# Patient Record
Sex: FEMALE | Race: OTHER | HISPANIC OR LATINO | URBAN - METROPOLITAN AREA
[De-identification: names, ages, dates, MRNs, and addresses within clinical notes are randomized per-mention and may not be internally consistent; named-entity substitution may affect disease eponyms.]

---

## 2019-11-19 ENCOUNTER — EMERGENCY (EMERGENCY)
Facility: HOSPITAL | Age: 84
LOS: 1 days | Discharge: HOME | End: 2019-11-19
Attending: EMERGENCY MEDICINE
Payer: SELF-PAY

## 2019-11-19 ENCOUNTER — INPATIENT (INPATIENT)
Facility: HOSPITAL | Age: 84
LOS: 5 days | Discharge: HOME | End: 2019-11-25
Attending: INTERNAL MEDICINE | Admitting: INTERNAL MEDICINE
Payer: SELF-PAY

## 2019-11-19 VITALS
SYSTOLIC BLOOD PRESSURE: 169 MMHG | HEART RATE: 97 BPM | DIASTOLIC BLOOD PRESSURE: 84 MMHG | RESPIRATION RATE: 18 BRPM | TEMPERATURE: 98 F | OXYGEN SATURATION: 97 %

## 2019-11-19 VITALS
HEART RATE: 99 BPM | DIASTOLIC BLOOD PRESSURE: 72 MMHG | SYSTOLIC BLOOD PRESSURE: 136 MMHG | TEMPERATURE: 98 F | OXYGEN SATURATION: 97 % | RESPIRATION RATE: 16 BRPM

## 2019-11-19 VITALS
TEMPERATURE: 98 F | HEART RATE: 83 BPM | RESPIRATION RATE: 16 BRPM | OXYGEN SATURATION: 96 % | SYSTOLIC BLOOD PRESSURE: 156 MMHG | WEIGHT: 171.96 LBS | DIASTOLIC BLOOD PRESSURE: 86 MMHG | HEIGHT: 57 IN

## 2019-11-19 DIAGNOSIS — R04.0 EPISTAXIS: ICD-10-CM

## 2019-11-19 PROBLEM — Z00.00 ENCOUNTER FOR PREVENTIVE HEALTH EXAMINATION: Status: ACTIVE | Noted: 2019-11-19

## 2019-11-19 LAB
ALBUMIN SERPL ELPH-MCNC: 4.2 G/DL — SIGNIFICANT CHANGE UP (ref 3.3–5)
ALP SERPL-CCNC: 87 U/L — SIGNIFICANT CHANGE UP (ref 40–120)
ALT FLD-CCNC: 10 U/L — SIGNIFICANT CHANGE UP (ref 4–33)
ANION GAP SERPL CALC-SCNC: 15 MMO/L — HIGH (ref 7–14)
ANION GAP SERPL CALC-SCNC: 7 MMOL/L — SIGNIFICANT CHANGE UP (ref 5–17)
APTT BLD: 27.7 SEC — SIGNIFICANT CHANGE UP (ref 27.5–36.3)
AST SERPL-CCNC: 19 U/L — SIGNIFICANT CHANGE UP (ref 4–32)
BASOPHILS # BLD AUTO: 0.03 K/UL — SIGNIFICANT CHANGE UP (ref 0–0.2)
BASOPHILS # BLD AUTO: 0.05 K/UL — SIGNIFICANT CHANGE UP (ref 0–0.2)
BASOPHILS NFR BLD AUTO: 0.2 % — SIGNIFICANT CHANGE UP (ref 0–2)
BASOPHILS NFR BLD AUTO: 0.2 % — SIGNIFICANT CHANGE UP (ref 0–2)
BILIRUB SERPL-MCNC: 0.6 MG/DL — SIGNIFICANT CHANGE UP (ref 0.2–1.2)
BLD GP AB SCN SERPL QL: POSITIVE — SIGNIFICANT CHANGE UP
BUN SERPL-MCNC: 11 MG/DL — SIGNIFICANT CHANGE UP (ref 7–18)
BUN SERPL-MCNC: 12 MG/DL — SIGNIFICANT CHANGE UP (ref 7–23)
CALCIUM SERPL-MCNC: 9.1 MG/DL — SIGNIFICANT CHANGE UP (ref 8.4–10.5)
CALCIUM SERPL-MCNC: 9.2 MG/DL — SIGNIFICANT CHANGE UP (ref 8.4–10.5)
CHLORIDE SERPL-SCNC: 102 MMOL/L — SIGNIFICANT CHANGE UP (ref 96–108)
CHLORIDE SERPL-SCNC: 98 MMOL/L — SIGNIFICANT CHANGE UP (ref 98–107)
CO2 SERPL-SCNC: 24 MMOL/L — SIGNIFICANT CHANGE UP (ref 22–31)
CO2 SERPL-SCNC: 27 MMOL/L — SIGNIFICANT CHANGE UP (ref 22–31)
CREAT SERPL-MCNC: 0.67 MG/DL — SIGNIFICANT CHANGE UP (ref 0.5–1.3)
CREAT SERPL-MCNC: 0.7 MG/DL — SIGNIFICANT CHANGE UP (ref 0.5–1.3)
EOSINOPHIL # BLD AUTO: 0 K/UL — SIGNIFICANT CHANGE UP (ref 0–0.5)
EOSINOPHIL # BLD AUTO: 0.01 K/UL — SIGNIFICANT CHANGE UP (ref 0–0.5)
EOSINOPHIL NFR BLD AUTO: 0 % — SIGNIFICANT CHANGE UP (ref 0–6)
EOSINOPHIL NFR BLD AUTO: 0.1 % — SIGNIFICANT CHANGE UP (ref 0–6)
GLUCOSE SERPL-MCNC: 123 MG/DL — HIGH (ref 70–99)
GLUCOSE SERPL-MCNC: 132 MG/DL — HIGH (ref 70–99)
HCT VFR BLD CALC: 38.5 % — SIGNIFICANT CHANGE UP (ref 34.5–45)
HCT VFR BLD CALC: 39.2 % — SIGNIFICANT CHANGE UP (ref 34.5–45)
HGB BLD-MCNC: 12.3 G/DL — SIGNIFICANT CHANGE UP (ref 11.5–15.5)
HGB BLD-MCNC: 12.3 G/DL — SIGNIFICANT CHANGE UP (ref 11.5–15.5)
IMM GRANULOCYTES NFR BLD AUTO: 0.5 % — SIGNIFICANT CHANGE UP (ref 0–1.5)
IMM GRANULOCYTES NFR BLD AUTO: 0.5 % — SIGNIFICANT CHANGE UP (ref 0–1.5)
INR BLD: 1.1 RATIO — SIGNIFICANT CHANGE UP (ref 0.88–1.16)
LYMPHOCYTES # BLD AUTO: 15.9 % — SIGNIFICANT CHANGE UP (ref 13–44)
LYMPHOCYTES # BLD AUTO: 17.2 % — SIGNIFICANT CHANGE UP (ref 13–44)
LYMPHOCYTES # BLD AUTO: 3.07 K/UL — SIGNIFICANT CHANGE UP (ref 1–3.3)
LYMPHOCYTES # BLD AUTO: 3.19 K/UL — SIGNIFICANT CHANGE UP (ref 1–3.3)
MCHC RBC-ENTMCNC: 23.5 PG — LOW (ref 27–34)
MCHC RBC-ENTMCNC: 23.8 PG — LOW (ref 27–34)
MCHC RBC-ENTMCNC: 31.4 GM/DL — LOW (ref 32–36)
MCHC RBC-ENTMCNC: 31.9 % — LOW (ref 32–36)
MCV RBC AUTO: 74.6 FL — LOW (ref 80–100)
MCV RBC AUTO: 75 FL — LOW (ref 80–100)
MONOCYTES # BLD AUTO: 1.35 K/UL — HIGH (ref 0–0.9)
MONOCYTES # BLD AUTO: 1.45 K/UL — HIGH (ref 0–0.9)
MONOCYTES NFR BLD AUTO: 7.2 % — SIGNIFICANT CHANGE UP (ref 2–14)
MONOCYTES NFR BLD AUTO: 7.6 % — SIGNIFICANT CHANGE UP (ref 2–14)
NEUTROPHILS # BLD AUTO: 13.27 K/UL — HIGH (ref 1.8–7.4)
NEUTROPHILS # BLD AUTO: 15.27 K/UL — HIGH (ref 1.8–7.4)
NEUTROPHILS NFR BLD AUTO: 74.4 % — SIGNIFICANT CHANGE UP (ref 43–77)
NEUTROPHILS NFR BLD AUTO: 76.2 % — SIGNIFICANT CHANGE UP (ref 43–77)
NRBC # BLD: 0 /100 WBCS — SIGNIFICANT CHANGE UP (ref 0–0)
NRBC # FLD: 0 K/UL — SIGNIFICANT CHANGE UP (ref 0–0)
PLATELET # BLD AUTO: 269 K/UL — SIGNIFICANT CHANGE UP (ref 150–400)
PLATELET # BLD AUTO: 275 K/UL — SIGNIFICANT CHANGE UP (ref 150–400)
PMV BLD: 11.7 FL — SIGNIFICANT CHANGE UP (ref 7–13)
POTASSIUM SERPL-MCNC: 3.7 MMOL/L — SIGNIFICANT CHANGE UP (ref 3.5–5.3)
POTASSIUM SERPL-MCNC: 3.8 MMOL/L — SIGNIFICANT CHANGE UP (ref 3.5–5.3)
POTASSIUM SERPL-SCNC: 3.7 MMOL/L — SIGNIFICANT CHANGE UP (ref 3.5–5.3)
POTASSIUM SERPL-SCNC: 3.8 MMOL/L — SIGNIFICANT CHANGE UP (ref 3.5–5.3)
PROT SERPL-MCNC: 7.6 G/DL — SIGNIFICANT CHANGE UP (ref 6–8.3)
PROTHROM AB SERPL-ACNC: 12.2 SEC — SIGNIFICANT CHANGE UP (ref 10–12.9)
RBC # BLD: 5.16 M/UL — SIGNIFICANT CHANGE UP (ref 3.8–5.2)
RBC # BLD: 5.23 M/UL — HIGH (ref 3.8–5.2)
RBC # FLD: 17.5 % — HIGH (ref 10.3–14.5)
RBC # FLD: 17.6 % — HIGH (ref 10.3–14.5)
RH IG SCN BLD-IMP: POSITIVE — SIGNIFICANT CHANGE UP
SODIUM SERPL-SCNC: 136 MMOL/L — SIGNIFICANT CHANGE UP (ref 135–145)
SODIUM SERPL-SCNC: 137 MMOL/L — SIGNIFICANT CHANGE UP (ref 135–145)
WBC # BLD: 17.82 K/UL — HIGH (ref 3.8–10.5)
WBC # BLD: 20.06 K/UL — HIGH (ref 3.8–10.5)
WBC # FLD AUTO: 17.82 K/UL — HIGH (ref 3.8–10.5)
WBC # FLD AUTO: 20.06 K/UL — HIGH (ref 3.8–10.5)

## 2019-11-19 PROCEDURE — 85027 COMPLETE CBC AUTOMATED: CPT

## 2019-11-19 PROCEDURE — 85610 PROTHROMBIN TIME: CPT

## 2019-11-19 PROCEDURE — 99284 EMERGENCY DEPT VISIT MOD MDM: CPT

## 2019-11-19 PROCEDURE — 86905 BLOOD TYPING RBC ANTIGENS: CPT

## 2019-11-19 PROCEDURE — 86901 BLOOD TYPING SEROLOGIC RH(D): CPT

## 2019-11-19 PROCEDURE — 86870 RBC ANTIBODY IDENTIFICATION: CPT

## 2019-11-19 PROCEDURE — 36415 COLL VENOUS BLD VENIPUNCTURE: CPT

## 2019-11-19 PROCEDURE — 80048 BASIC METABOLIC PNL TOTAL CA: CPT

## 2019-11-19 PROCEDURE — 86850 RBC ANTIBODY SCREEN: CPT

## 2019-11-19 PROCEDURE — 96374 THER/PROPH/DIAG INJ IV PUSH: CPT

## 2019-11-19 PROCEDURE — 99053 MED SERV 10PM-8AM 24 HR FAC: CPT

## 2019-11-19 PROCEDURE — 99284 EMERGENCY DEPT VISIT MOD MDM: CPT | Mod: 25

## 2019-11-19 PROCEDURE — 86900 BLOOD TYPING SEROLOGIC ABO: CPT

## 2019-11-19 PROCEDURE — 85730 THROMBOPLASTIN TIME PARTIAL: CPT

## 2019-11-19 RX ORDER — OXYMETAZOLINE HYDROCHLORIDE 0.5 MG/ML
2 SPRAY NASAL ONCE
Refills: 0 | Status: COMPLETED | OUTPATIENT
Start: 2019-11-19 | End: 2019-11-19

## 2019-11-19 RX ORDER — SODIUM CHLORIDE 9 MG/ML
3 INJECTION INTRAMUSCULAR; INTRAVENOUS; SUBCUTANEOUS ONCE
Refills: 0 | Status: COMPLETED | OUTPATIENT
Start: 2019-11-19 | End: 2019-11-19

## 2019-11-19 RX ORDER — CEPHALEXIN 500 MG
500 CAPSULE ORAL ONCE
Refills: 0 | Status: COMPLETED | OUTPATIENT
Start: 2019-11-19 | End: 2019-11-19

## 2019-11-19 RX ORDER — TRANEXAMIC ACID 100 MG/ML
1000 INJECTION, SOLUTION INTRAVENOUS ONCE
Refills: 0 | Status: COMPLETED | OUTPATIENT
Start: 2019-11-19 | End: 2019-11-19

## 2019-11-19 RX ORDER — SODIUM CHLORIDE 9 MG/ML
1000 INJECTION INTRAMUSCULAR; INTRAVENOUS; SUBCUTANEOUS ONCE
Refills: 0 | Status: COMPLETED | OUTPATIENT
Start: 2019-11-19 | End: 2019-11-19

## 2019-11-19 RX ORDER — LABETALOL HCL 100 MG
10 TABLET ORAL ONCE
Refills: 0 | Status: COMPLETED | OUTPATIENT
Start: 2019-11-19 | End: 2019-11-19

## 2019-11-19 RX ADMIN — Medication 500 MILLIGRAM(S): at 19:53

## 2019-11-19 RX ADMIN — Medication 10 MILLIGRAM(S): at 17:05

## 2019-11-19 RX ADMIN — TRANEXAMIC ACID 220 MILLIGRAM(S): 100 INJECTION, SOLUTION INTRAVENOUS at 06:00

## 2019-11-19 RX ADMIN — SODIUM CHLORIDE 1000 MILLILITER(S): 9 INJECTION INTRAMUSCULAR; INTRAVENOUS; SUBCUTANEOUS at 14:39

## 2019-11-19 RX ADMIN — SODIUM CHLORIDE 3 MILLILITER(S): 9 INJECTION INTRAMUSCULAR; INTRAVENOUS; SUBCUTANEOUS at 07:05

## 2019-11-19 NOTE — ED PROVIDER NOTE - PROGRESS NOTE DETAILS
Maryuri Anthony MD: Pt s/p rhinorocket L nostril still with persistent bleeding. Hypertensive to 190/90's reduced to 160/60's. ENT repaged admit to CDU for obs overnight, ent to remove rhinorocket in am

## 2019-11-19 NOTE — ED ADULT NURSE NOTE - CHIEF COMPLAINT QUOTE
Pt c/o nose bleed , seen at [place] and [place]; packing done in [place].  Denies use of blood thinner, headache, dizziness

## 2019-11-19 NOTE — PROGRESS NOTE ADULT - SUBJECTIVE AND OBJECTIVE BOX
ORL following for epistaxis    Right rhinorocket placed in ED, left rhinorocket placed by ORL  [facility] called about recurrent left sided nasal bleeding    Left rhinorocket inflated to 7.5cc with resolution of active bleeding  No active bleeding from nare or oropharynx    83 no sig PMH presented with epistaxis, possible bilateral  -s/p bilateral rhino rocket  -admit to medicine for monitoring, needs monitored setting pulse ox, cardiac monitoring  -BP control  -leave bilateral rhino rockets in place  -keflex while nasal packing in place  -page/call if persistent, recurrent epistaxis  -will follow

## 2019-11-19 NOTE — ED PROVIDER NOTE - PATIENT PORTAL LINK FT
You can access the FollowMyHealth Patient Portal offered by [facility] by registering at the following website: [email or web address]. By joining [facility]’s FollowMyHealth portal, you will also be able to view your health information using other applications (apps) compatible with our system.

## 2019-11-19 NOTE — ED PROVIDER NOTE - OBJECTIVE STATEMENT
Pt with epistaxis from both nostrils x 3 days, pt was evaluated past 2 days at [facility] and had initial b/l rhinoroket placed. Pt had left nostril packing removed yesterday. Pt with progressive left epistaxis. No reposited trauma, no LOC, no chest pain.  Right nostril Rhinorocket left in place.  Left nostril with active epistaxis, Merocel infused with TXA placed.  Pt is currently on Auxilin rx;d from [facility].  Pt is not taking antiplatelet or anticoagulant agents.

## 2019-11-19 NOTE — ED PROVIDER NOTE - ATTENDING CONTRIBUTION TO CARE
I performed a face to face bedside interview with patient regarding history of present illness, review of symptoms and past medical history. I completed an independent physical exam.  I have discussed patient's plan of care.   I agree with note as stated above, having amended the EMR as needed to reflect my findings. I have discussed the assessment and plan of care.  This includes during the time I functioned as the attending physician for this patient.  Attending Contribution to Care: agree with plan of resident. 82yo female with no significant PMH who presents with several days of epistaxis s/p packing of L nostril and rhinorocket placement in R nostril from OSH today. Progressing soaking of L packing which brings pt into [facility] ED. No AC use. No digital trauma, lightheadedness, dizziness, headache, CP, SOB. No hx of epistaxis in the past.   opposite rhinorocket placed with resolution of bleeding. observed in ed for 2 hours and stable for d/c pending no worsening bleed

## 2019-11-19 NOTE — ED PROVIDER NOTE - NS ED ROS FT
General: denies fever, chills  HENT: denies nasal congestion, sore throat, rhinorrhea  Eyes: denies vision changes  CV: denies chest pain  Resp: denies difficulty breathing, cough  Abdominal: denies nausea, vomiting, diarrhea, abdominal pain, blood in stool, dark stool  : denies pain with urination  MSK: denies recent trauma  Neuro: denies headaches, numbness, tingling, dizziness, lightheadedness.  Skin: denies new rashes  Endocrine: denies recent weight loss

## 2019-11-19 NOTE — ED PROVIDER NOTE - NSFOLLOWUPINSTRUCTIONS_ED_ALL_ED_FT
Return if pain, bleeding returns, weakness any concerns. Follow up with ENT call [phone] or [phone].  Continue with Amoxicillin.   See your doctor as soon as possible (within 1-2 days).   If you need further assistance for appointments you can contact the [place] Care Coordinator at [phone] or the [facility] Patient Access Services helpline at [phone] to find names/contact #s for a practitioner to follow up with.  Bring your discharge papers / test results with you to any follow up appointments.   In addition our outpatient [facility] is located at [address] [place] [address], tel: [phone].

## 2019-11-19 NOTE — ED PROVIDER NOTE - PHYSICAL EXAMINATION
CONSTITUTIONAL: Nontoxic, well nourished, well developed, elderly female, resting comfortably in no acute distress  HEAD: Normocephalic; atraumatic  EYES: Normal inspection, EOMI  ENMT: Blood soaked packing in L nostril, rhinorocket in R nostril, normal oropharynx  NECK: Supple; non-tender; no cervical lymphadenopathy  CARD: RRR; no audible murmurs, rubs, or gallops  RESP: No respiratory distress, lungs ctab/l  ABD: Soft, non-distended; non-tender; no rebound or guarding  EXT: No LE pitting edema or calf tenderness; distal pulses intact with good capillary refill  SKIN: Warm, dry, intact  NEURO: aaox3, moving all extremities spontaneously

## 2019-11-19 NOTE — ED PROVIDER NOTE - CLINICAL SUMMARY MEDICAL DECISION MAKING FREE TEXT BOX
Pt with 3rd ED visit for epistaxis (2 x [place], 1X at ).. ENT paged  via Transfer center. Pt with current hemostasis. 7:15p Pt with 3rd ED visit for epistaxis (2 x [place], 1X at ).. ENT paged  via Transfer center, awaiting call back. Pt with current hemostasis. 7:15p Pt with 3rd ED visit for epistaxis (2 x [place], 1X at ).. ENT paged  via Transfer center, awaiting call back. Pt with current hemostasis.  7:40a- I spoke to ENT YULIANA Condon, informed given current hemostasis and no active bleeding after packing, packing will still be left in place and any further procedures will not be performed. Pt and pt's grand-daughter (Jamila) agrees with current dc and ENT office follow up cotnacts provided.   Pt is well appearing, has no new complaints and able to walk with normal gait. Pt is stable for discharge and follow up with medical doctor. Pt educated on care and need for follow up. Discussed anticipatory guidance and return precautions. Questions answered. I had a detailed discussion with the patient and/or guardian regarding the historical points, exam findings, and any diagnostic results supporting the discharge diagnosis.

## 2019-11-19 NOTE — ED ADULT TRIAGE NOTE - CHIEF COMPLAINT QUOTE
Pt c/o nose bleed , seen at [place] and [place]; packing done in [place].  Denies use of blood thinner, headache, dizziness

## 2019-11-19 NOTE — ED ADULT NURSE REASSESSMENT NOTE - NS ED NURSE REASSESS COMMENT FT1
pt received alert and oreinted x3. pt nad. has b/l rhinorockets in place. in bleeding noted at the moment. iv intact. Call bell in reach, warm blanket provided, bed in lowest position, side rails up x2,safety maintained. will continue to monitor.

## 2019-11-19 NOTE — CONSULT NOTE ADULT - SUBJECTIVE AND OBJECTIVE BOX
83 year old female with no medical illnesses that presents to the ED with BL epistaxis after being evaluated  at OSH. Patient had right RR. now w bleeding on left. Denies any blood thinners or trauma.   No other complaints.     AVSS  HEENT:  mouth: + post. pharyngeal bleeding.   Placed RR on left 5cm an inflated with 5cc of air.  Patient tolerated well. No additional bleeding from   posterior pharynx of left nares.

## 2019-11-19 NOTE — CONSULT NOTE ADULT - PROBLEM SELECTOR RECOMMENDATION 9
1. Observe with Rhino rockets in place for 2 hours. then can be discharged with follow up at clinic or ED for removal on friday.  2. Keflex 500mg bid for 3 days with packing   3. Return to ED with any additional.

## 2019-11-19 NOTE — PROGRESS NOTE ADULT - SUBJECTIVE AND OBJECTIVE BOX
ENT came by for reassessment. /91. Repeat /90. No active bleeding with bl RR in place. Plan to remove L RR.     Recommendations:  - BP management with goal <140/70  - please page ENT after BP med given and BP stabilized for removal of L RR  - if patient does require bilateral RR, will need to be admitted for monitoring    - home recommendations:  - keflex while packing in place  - afrin TID bilateral nasal cavities x3 days, then only for bleeding  - nasal saline sprays to bilateral nasal cavities qid (while packing in place) to keep packing moist  - humidification

## 2019-11-19 NOTE — ED ADULT NURSE NOTE - NSIMPLEMENTINTERV_GEN_ALL_ED
Implemented All Fall Risk Interventions:  [place] to call system. Call bell, personal items and telephone within reach. Instruct patient to call for assistance. Room bathroom lighting operational. Non-slip footwear when patient is off stretcher. Physically safe environment: no spills, clutter or unnecessary equipment. Stretcher in lowest position, wheels locked, appropriate side rails in place. Provide visual cue, wrist band, yellow gown, etc. Monitor gait and stability. Monitor for mental status changes and reorient to person, place, and time. Review medications for side effects contributing to fall risk. Reinforce activity limits and safety measures with patient and family.

## 2019-11-19 NOTE — ED PROVIDER NOTE - OBJECTIVE STATEMENT
Maryuri Anthony MD: 82yo female with no significant PMH who presents with several days of epistaxis s/p packing of L nostril and rhinorocket placement in R nostril from OSH today. Progressing soaking of L packing which brings pt into [facility] ED. No AC use. No digital trauma, lightheadedness, dizziness, headache, CP, SOB. No hx of epistaxis in the past.

## 2019-11-19 NOTE — ED ADULT TRIAGE NOTE - CHIEF COMPLAINT QUOTE
c/o on and off bleeding from the nose since yesterday,was seen in [place] yesterday also as per son

## 2019-11-19 NOTE — ED ADULT NURSE NOTE - CHIEF COMPLAINT QUOTE
c/o on and off bleeding from the nose since yesterday, was seen in [place] yesterday also as per son

## 2019-11-19 NOTE — ED ADULT NURSE NOTE - OBJECTIVE STATEMENT
[place] RN note: Pt. A&Ox3, primarily Israeli speaking with family at bedside for translation. Pt. c/o intermittent nosebleed to b/l nostrils since Sunday. States he went to [place] Sunday and yesterday. Then went to [place] this AM and was told if bleeding persisted to come to [facility]. Pt. c/o facial pain, dizziness and weakness. Family states packing was changed 4-5 times. Packing appears soaked with blood. #20g IV Placed in let AC, labs sent. MD at bedside for eval. VS done as charted, breathing well on RA. Respirations even and unlabored. Will continue to monitor.

## 2019-11-19 NOTE — ED PROVIDER NOTE - CLINICAL SUMMARY MEDICAL DECISION MAKING FREE TEXT BOX
Maryuri Anthony MD: 84yo female with no significant PMH who presents with several days of epistaxis s/p packing of L nostril and rhinorocket placement in R nostril from OSH today. Pt hemodynamically stable, afebrile. Well appearing. B/l nostrils packed with progressive bleeding. Plan: labs, ENT consult

## 2019-11-20 DIAGNOSIS — Z90.49 ACQUIRED ABSENCE OF OTHER SPECIFIED PARTS OF DIGESTIVE TRACT: Chronic | ICD-10-CM

## 2019-11-20 DIAGNOSIS — R65.10 SYSTEMIC INFLAMMATORY RESPONSE SYNDROME (SIRS) OF NON-INFECTIOUS ORIGIN WITHOUT ACUTE ORGAN DYSFUNCTION: ICD-10-CM

## 2019-11-20 DIAGNOSIS — R04.0 EPISTAXIS: ICD-10-CM

## 2019-11-20 DIAGNOSIS — I10 ESSENTIAL (PRIMARY) HYPERTENSION: ICD-10-CM

## 2019-11-20 DIAGNOSIS — A41.9 SEPSIS, UNSPECIFIED ORGANISM: ICD-10-CM

## 2019-11-20 DIAGNOSIS — D62 ACUTE POSTHEMORRHAGIC ANEMIA: ICD-10-CM

## 2019-11-20 DIAGNOSIS — Z29.9 ENCOUNTER FOR PROPHYLACTIC MEASURES, UNSPECIFIED: ICD-10-CM

## 2019-11-20 LAB
ANISOCYTOSIS BLD QL: SLIGHT — SIGNIFICANT CHANGE UP
APPEARANCE UR: CLEAR — SIGNIFICANT CHANGE UP
BASE EXCESS BLDV CALC-SCNC: 1.5 MMOL/L — SIGNIFICANT CHANGE UP
BASOPHILS # BLD AUTO: 0.07 K/UL — SIGNIFICANT CHANGE UP (ref 0–0.2)
BASOPHILS NFR BLD AUTO: 0.4 % — SIGNIFICANT CHANGE UP (ref 0–2)
BASOPHILS NFR SPEC: 0 % — SIGNIFICANT CHANGE UP (ref 0–2)
BILIRUB UR-MCNC: NEGATIVE — SIGNIFICANT CHANGE UP
BLASTS # FLD: 0 % — SIGNIFICANT CHANGE UP (ref 0–0)
BLOOD GAS VENOUS - CREATININE: 0.59 MG/DL — SIGNIFICANT CHANGE UP (ref 0.5–1.3)
BLOOD GAS VENOUS - FIO2: 21 — SIGNIFICANT CHANGE UP
BLOOD UR QL VISUAL: NEGATIVE — SIGNIFICANT CHANGE UP
CHLORIDE BLDV-SCNC: 102 MMOL/L — SIGNIFICANT CHANGE UP (ref 96–108)
COLOR SPEC: SIGNIFICANT CHANGE UP
EOSINOPHIL # BLD AUTO: 0.03 K/UL — SIGNIFICANT CHANGE UP (ref 0–0.5)
EOSINOPHIL NFR BLD AUTO: 0.2 % — SIGNIFICANT CHANGE UP (ref 0–6)
EOSINOPHIL NFR FLD: 0 % — SIGNIFICANT CHANGE UP (ref 0–6)
GAS PNL BLDV: 134 MMOL/L — LOW (ref 136–146)
GIANT PLATELETS BLD QL SMEAR: PRESENT — SIGNIFICANT CHANGE UP
GLUCOSE BLDV-MCNC: 140 MG/DL — HIGH (ref 70–99)
GLUCOSE UR-MCNC: NEGATIVE — SIGNIFICANT CHANGE UP
HBA1C BLD-MCNC: 5.7 % — HIGH (ref 4–5.6)
HCO3 BLDV-SCNC: 25 MMOL/L — SIGNIFICANT CHANGE UP (ref 20–27)
HCT VFR BLD CALC: 33.1 % — LOW (ref 34.5–45)
HCT VFR BLDV CALC: 32.7 % — LOW (ref 34.5–45)
HGB BLD-MCNC: 10.8 G/DL — LOW (ref 11.5–15.5)
HGB BLDV-MCNC: 10.6 G/DL — LOW (ref 11.5–15.5)
HYPOCHROMIA BLD QL: SIGNIFICANT CHANGE UP
IMM GRANULOCYTES NFR BLD AUTO: 0.5 % — SIGNIFICANT CHANGE UP (ref 0–1.5)
KETONES UR-MCNC: NEGATIVE — SIGNIFICANT CHANGE UP
LACTATE BLDV-MCNC: 2.1 MMOL/L — HIGH (ref 0.5–2)
LEUKOCYTE ESTERASE UR-ACNC: NEGATIVE — SIGNIFICANT CHANGE UP
LYMPHOCYTES # BLD AUTO: 19 % — SIGNIFICANT CHANGE UP (ref 13–44)
LYMPHOCYTES # BLD AUTO: 3.52 K/UL — HIGH (ref 1–3.3)
LYMPHOCYTES NFR SPEC AUTO: 4.6 % — LOW (ref 13–44)
MCHC RBC-ENTMCNC: 24.4 PG — LOW (ref 27–34)
MCHC RBC-ENTMCNC: 32.6 % — SIGNIFICANT CHANGE UP (ref 32–36)
MCV RBC AUTO: 74.7 FL — LOW (ref 80–100)
METAMYELOCYTES # FLD: 0.9 % — SIGNIFICANT CHANGE UP (ref 0–1)
MICROCYTES BLD QL: SIGNIFICANT CHANGE UP
MONOCYTES # BLD AUTO: 1.57 K/UL — HIGH (ref 0–0.9)
MONOCYTES NFR BLD AUTO: 8.5 % — SIGNIFICANT CHANGE UP (ref 2–14)
MONOCYTES NFR BLD: 7.3 % — SIGNIFICANT CHANGE UP (ref 2–9)
MYELOCYTES NFR BLD: 2.8 % — HIGH (ref 0–0)
NEUTROPHIL AB SER-ACNC: 79.8 % — HIGH (ref 43–77)
NEUTROPHILS # BLD AUTO: 13.2 K/UL — HIGH (ref 1.8–7.4)
NEUTROPHILS NFR BLD AUTO: 71.4 % — SIGNIFICANT CHANGE UP (ref 43–77)
NEUTS BAND # BLD: 0 % — SIGNIFICANT CHANGE UP (ref 0–6)
NITRITE UR-MCNC: NEGATIVE — SIGNIFICANT CHANGE UP
NRBC # BLD: 2 /100WBC — SIGNIFICANT CHANGE UP
NRBC # FLD: 0 K/UL — SIGNIFICANT CHANGE UP (ref 0–0)
OTHER - HEMATOLOGY %: 0 — SIGNIFICANT CHANGE UP
OVALOCYTES BLD QL SMEAR: SLIGHT — SIGNIFICANT CHANGE UP
PCO2 BLDV: 41 MMHG — SIGNIFICANT CHANGE UP (ref 41–51)
PH BLDV: 7.41 PH — SIGNIFICANT CHANGE UP (ref 7.32–7.43)
PH UR: 7 — SIGNIFICANT CHANGE UP (ref 5–8)
PLATELET # BLD AUTO: 222 K/UL — SIGNIFICANT CHANGE UP (ref 150–400)
PLATELET COUNT - ESTIMATE: NORMAL — SIGNIFICANT CHANGE UP
PMV BLD: 11.6 FL — SIGNIFICANT CHANGE UP (ref 7–13)
PO2 BLDV: 45 MMHG — HIGH (ref 35–40)
POIKILOCYTOSIS BLD QL AUTO: SLIGHT — SIGNIFICANT CHANGE UP
POTASSIUM BLDV-SCNC: 3.6 MMOL/L — SIGNIFICANT CHANGE UP (ref 3.4–4.5)
PROMYELOCYTES # FLD: 0 % — SIGNIFICANT CHANGE UP (ref 0–0)
PROT UR-MCNC: 10 — SIGNIFICANT CHANGE UP
RBC # BLD: 4.43 M/UL — SIGNIFICANT CHANGE UP (ref 3.8–5.2)
RBC # FLD: 17.2 % — HIGH (ref 10.3–14.5)
SAO2 % BLDV: 81.9 % — SIGNIFICANT CHANGE UP (ref 60–85)
SP GR SPEC: 1.02 — SIGNIFICANT CHANGE UP (ref 1–1.04)
STOMATOCYTES BLD QL SMEAR: SLIGHT — SIGNIFICANT CHANGE UP
UROBILINOGEN FLD QL: NORMAL — SIGNIFICANT CHANGE UP
VARIANT LYMPHS # BLD: 4.6 % — SIGNIFICANT CHANGE UP
WBC # BLD: 18.48 K/UL — HIGH (ref 3.8–10.5)
WBC # FLD AUTO: 18.48 K/UL — HIGH (ref 3.8–10.5)

## 2019-11-20 PROCEDURE — 71046 X-RAY EXAM CHEST 2 VIEWS: CPT | Mod: 26

## 2019-11-20 PROCEDURE — 99223 1ST HOSP IP/OBS HIGH 75: CPT | Mod: GC

## 2019-11-20 RX ORDER — AMLODIPINE BESYLATE 2.5 MG/1
5 TABLET ORAL DAILY
Refills: 0 | Status: DISCONTINUED | OUTPATIENT
Start: 2019-11-20 | End: 2019-11-25

## 2019-11-20 RX ORDER — CEPHALEXIN 500 MG
500 CAPSULE ORAL EVERY 12 HOURS
Refills: 0 | Status: DISCONTINUED | OUTPATIENT
Start: 2019-11-20 | End: 2019-11-22

## 2019-11-20 RX ORDER — ACETAMINOPHEN 500 MG
650 TABLET ORAL ONCE
Refills: 0 | Status: COMPLETED | OUTPATIENT
Start: 2019-11-20 | End: 2019-11-20

## 2019-11-20 RX ORDER — SODIUM CHLORIDE 9 MG/ML
1000 INJECTION INTRAMUSCULAR; INTRAVENOUS; SUBCUTANEOUS ONCE
Refills: 0 | Status: COMPLETED | OUTPATIENT
Start: 2019-11-20 | End: 2019-11-20

## 2019-11-20 RX ORDER — CEPHALEXIN 500 MG
500 CAPSULE ORAL EVERY 12 HOURS
Refills: 0 | Status: DISCONTINUED | OUTPATIENT
Start: 2019-11-19 | End: 2019-11-20

## 2019-11-20 RX ADMIN — AMLODIPINE BESYLATE 5 MILLIGRAM(S): 2.5 TABLET ORAL at 18:13

## 2019-11-20 RX ADMIN — Medication 650 MILLIGRAM(S): at 02:42

## 2019-11-20 RX ADMIN — SODIUM CHLORIDE 1000 MILLILITER(S): 9 INJECTION INTRAMUSCULAR; INTRAVENOUS; SUBCUTANEOUS at 02:43

## 2019-11-20 RX ADMIN — Medication 500 MILLIGRAM(S): at 18:13

## 2019-11-20 RX ADMIN — Medication 1 TABLET(S): at 06:46

## 2019-11-20 RX ADMIN — Medication 650 MILLIGRAM(S): at 03:15

## 2019-11-20 NOTE — H&P ADULT - HISTORY OF PRESENT ILLNESS
**THIS NOTE IS INCOMPLETE**    Patient is an 83 y.o F w/ no significant PMH who presented to the ED w/ epistasis that started a few days ago. She initially presented to [place] ED who placed bilateral rhinorockets and removed the left packing yesterday. She continued to have nasal bleeding so presented to [facility] ED.     In the ED, vitals T max 1005. HR 79=86 /80 -164/85 RR 16-18 SPO2 96-98% on RA. Notable labs: Hgb 12.3 --> 10.8, WBC 12.3, lactate 2.1. UA negative, CXR clear. s/p NS 1 L bolus x2, cephalexin x1, labetalol 10 IV x1, tylenol x1. Patient is an 83 y.o Bulgarian speaking F w/ no significant PMH (however has not been to a PCP in years) who presented to the ED w/ epistasis that started Sunday evening. She states she was in her usual state of health and watching television when she started having bleeding from her right nostril. She denies initially presented to [place] ED who placed bilateral rhinorockets and removed the left packing yesterday. She continued to have nasal bleeding so presented to [facility] ED.     In the ED, vitals T max 1005. HR 79=86 /80 -164/85 RR 16-18 SPO2 96-98% on RA. Notable labs: Hgb 12.3 --> 10.8, WBC 12.3, lactate 2.1. UA negative, CXR clear. s/p NS 1 L bolus x2, cephalexin x1, labetalol 10 IV x1, tylenol x1. Patient is an 83 y.o Telugu speaking F w/ no significant PMH (however has not been to a PCP in years) who presented to the ED w/ epistasis that started Sunday evening. Patient deferred translation to grand children who were at bedside. She states she was in her usual state of health and watching television when she started having bleeding from her right nostril. She denies any recent trauma, picking her nose, fever/chills, recent URI, or sick contacts. Her family initially brought her to [place] ED who placed a right rhinorocket. She was sent home but started to have left sided nasal bleeding and so she subsequently presented [place] ED where a left rhinorocket was placed. After being discharged from [place], she started having oozing around her packing which prompted her family to bring her to [facility] ED. She denies ever having bleeding episodes in the past, hematuria, blood in stool, ecchymoses, or gingival bleeding. She currently denies CP, SOB, or dizziness. She further denies taking any herbal medications, teas, aspirin, NSAIDs, or anticoagulation.     In the ED, vitals T max 1005. HR 79-86 /80 -164/85 RR 16-18 SPO2 96-98% on RA. Notable labs: Hgb 12.3 --> 10.8, WBC 12.3, lactate 2.1. UA negative, CXR clear. s/p NS 1 L bolus x2, cephalexin x1, labetalol 10 IV x1, tylenol x1.

## 2019-11-20 NOTE — H&P ADULT - PROBLEM SELECTOR PLAN 4
-Denies diagnosis of HTN but has not been to PCP in years   -BP elevated during hospitalization from 150s - 160s/80s. Will benefit from antihypertensive  -Start amlodipine 5 daily   -Monitor VS q8h

## 2019-11-20 NOTE — H&P ADULT - NSHPREVIEWOFSYSTEMS_GEN_ALL_CORE
CONSTITUTIONAL:  No weight loss, fever, chills, weakness or fatigue.  HEENT:  Eyes:  No visual loss, blurred vision, double vision or yellow sclerae. Ears, Nose, Throat:  No hearing loss, sneezing, congestion, runny nose or sore throat.  SKIN:  No rash or itching.  CARDIOVASCULAR:  No chest pain, chest pressure or chest discomfort. No palpitations or edema.  RESPIRATORY:  No shortness of breath, cough or sputum.  GASTROINTESTINAL:  No anorexia, nausea, vomiting or diarrhea. No abdominal pain or blood.  GENITOURINARY:  Denies hematuria, dysuria.   NEUROLOGICAL:  No headache, dizziness, syncope, paralysis, ataxia, numbness or tingling in the extremities. No change in bowel or bladder control.  MUSCULOSKELETAL:  No muscle, back pain, joint pain or stiffness.  HEMATOLOGIC:  No anemia, bleeding or bruising.  LYMPHATICS:  No enlarged nodes. No history of splenectomy.  PSYCHIATRIC:  No history of depression or anxiety.  ENDOCRINOLOGIC:  No reports of sweating, cold or heat intolerance. No polyuria or polydipsia.  ALLERGIES:  No history of asthma, hives, eczema or rhinitis. CONSTITUTIONAL:  No weight loss, fever, chills, weakness or fatigue.  HEENT:  Eyes:  No visual loss, blurred vision, double vision or yellow sclerae. Ears, Nose, Throat:  No hearing loss, sneezing, congestion, runny nose or sore throat.  SKIN:  No rash or itching.  CARDIOVASCULAR:  No chest pain, chest pressure or chest discomfort. No palpitations or edema.  RESPIRATORY:  No shortness of breath, cough or sputum.  GASTROINTESTINAL:  No anorexia, nausea, vomiting or diarrhea. No abdominal pain or blood.  GENITOURINARY:  Denies hematuria, dysuria.   NEUROLOGICAL:  No headache, dizziness, syncope, paralysis, ataxia, numbness or tingling in the extremities. No change in bowel or bladder control.  MUSCULOSKELETAL:  No muscle, back pain, joint pain or stiffness.  HEMATOLOGIC: +epistaxis, No anemia, bruising.  LYMPHATICS:  No enlarged nodes. No history of splenectomy.  PSYCHIATRIC:  No history of depression or anxiety.  ENDOCRINOLOGIC:  No reports of sweating, cold or heat intolerance. No polyuria or polydipsia.  ALLERGIES:  No history of asthma, hives, eczema or rhinitis.

## 2019-11-20 NOTE — H&P ADULT - PROBLEM SELECTOR PLAN 3
-P/w acute blood loss anemia 2/2 epistaxis  -Hgb initially 12.3, most recent CBC showed hgb 10.8  -Remains asymptomatic  -Achieved hemostasis w/ bilateral rhinorockets  -monitor CBC daily

## 2019-11-20 NOTE — H&P ADULT - NSHPPHYSICALEXAM_GEN_ALL_CORE
PHYSICAL EXAM:  GENERAL: NAD, well-developed  HEAD:  Atraumatic, Normocephalic  EYES: EOMI, PERRLA, conjunctiva and sclera clear  NECK: Supple, No JVD  CHEST/LUNG: Clear to auscultation bilaterally; No wheeze  HEART: Regular rate and rhythm; No murmurs, rubs, or gallops  ABDOMEN: Soft, Nontender, Nondistended; Bowel sounds present  EXTREMITIES:  2+ Peripheral Pulses, No clubbing, cyanosis, or edema  PSYCH: AAOx3  NEUROLOGY: non-focal  SKIN: No rashes or lesions PHYSICAL EXAM:  GENERAL: NAD  HEAD:  bilateral rhino rockets in place, no visible oozing around packing. Head atraumatic. No tenderness to palpation of maxillary sinuses  EYES: EOMI, PERRL, conjunctiva and sclera clear  NECK: Supple, no thyromegaly   CHEST/LUNG: Clear to auscultation bilaterally; No wheeze  HEART:S1 and S2, regular rate and rhythm; No murmurs, rubs, or gallops  ABDOMEN: Surgical scar noted above umbilcus, soft, Nontender, Nondistended; Bowel sounds present  EXTREMITIES:  2+ Peripheral Pulses, No clubbing, cyanosis, or edema  PSYCH: AAOx3  NEUROLOGY: non-focal  SKIN: No ecchymoses, rashes, or lesions

## 2019-11-20 NOTE — ED CDU PROVIDER SUBSEQUENT DAY NOTE - ATTENDING CONTRIBUTION TO CARE
agree with PA note    "83 y.o female with epistaxis sent to CDU for monitoring and ENT- ENT came by this morning unable to remove and keep packing out as it began to bleed again. Will trial gain tomorrow. Given fever, wbc and bilateral nasal packing will admit to medicine. Pt denies chest pain, sob, or trouble swallowing.  "    PE: b/l rhinorockets; no resp distress; CTAB/L; s1 s2 no m/r/g abd soft/NT/ND    given prolonged bleeding in elderly female with b/l rhinorockets will need to be admitted

## 2019-11-20 NOTE — ED CDU PROVIDER INITIAL DAY NOTE - ENMT, MLM
Airway patent. Mouth with normal mucosa. Throat has no vesicles, no oropharyngeal exudates and uvula is midline. B/L rhinorockets intact, no active epistaxis.

## 2019-11-20 NOTE — H&P ADULT - PROBLEM SELECTOR PLAN 2
P/w persistent epistaxis, isolated event   -Coags WNL, plt 275, no anti platelets or anticoagulation, no hx of trauma   -s/p b/l rhinorockets in place  -ENT consulted; will re-assess tomorrow whether to remove rhinorockets  -While b/l rhinorockets are in place, will need continuous pulse ox, monitor on telemetry   - c/w augmentin PO BID while nasal packing is in place  -On exam, no further over bleeding noted P/w persistent epistaxis, isolated event   -Coags WNL, plt 275, no anti platelets or anticoagulation, no hx of trauma   -s/p b/l rhinorockets in place  -ENT consulted; will re-assess tomorrow whether to remove rhinorockets  -While b/l rhinorockets are in place, will need continuous pulse ox, monitor on telemetry   - c/w keflex while nasal packing is in place  -On exam, no further over bleeding noted

## 2019-11-20 NOTE — ED CDU PROVIDER SUBSEQUENT DAY NOTE - PROGRESS NOTE DETAILS
Pt stable, no events overnight. Pt to be signed out to day team pending ENT reassess Pt spiked fever overnight, not tachycardic. WBC 17. will check cxr and UA to r/o infection, send blood cx, give tylenol, hydrate. pt denies any other complaints, no congestion, cough, abd pain, n/v/d, dysuria. pt on augmentin for rhinorockets. ent paged Spoke with ENT, states pt does not need to be admitted, can have on tele in cdu for continued monitoring and pulse ox. aware of fever and believes unlikely ENT in origin. states will likely take out one rhinorocket this morning and can test RVP.

## 2019-11-20 NOTE — H&P ADULT - NSHPLABSRESULTS_GEN_ALL_CORE
The Labs were reviewed by me   The Radiology was reviewed by me    EKG tracing reviewed by me        137  |  98  |  12  ----------------------------<  123<H>  3.7   |  24  |  0.67      136  |  102  |  11  ----------------------------<  132<H>  3.8   |  27  |  0.70    Ca    9.1      2019 13:50  Ca    9.2      2019 06:45    TPro  7.6  /  Alb  4.2  /  TBili  0.6  /  DBili  x   /  AST  19  /  ALT  10  /  AlkPhos  87            PT/INR - ( 2019 06:48 )   PT: 12.2 sec;   INR: 1.10 ratio         PTT - ( 2019 06:48 )  PTT:27.7 sec              Urinalysis Basic - ( 2019 02:30 )    Color: LIGHT YELLOW / Appearance: CLEAR / S.018 / pH: 7.0  Gluc: NEGATIVE / Ketone: NEGATIVE  / Bili: NEGATIVE / Urobili: NORMAL   Blood: NEGATIVE / Protein: 10 / Nitrite: NEGATIVE   Leuk Esterase: NEGATIVE / RBC: x / WBC x   Sq Epi: x / Non Sq Epi: x / Bacteria: x                              10.8   18.48 )-----------( 222      ( 2019 06:45 )             33.1                         12.3   17.82 )-----------( 269      ( 2019 13:50 )             38.5                         12.3   20.06 )-----------( 275      ( 2019 06:45 )             39.2     CAPILLARY BLOOD GLUCOSE

## 2019-11-20 NOTE — ED CDU PROVIDER DISPOSITION NOTE - CLINICAL COURSE
"83 y.o female with epistaxis sent to CDU for monitoring and ENT- ENT came by this morning unable to remove and keep packing out as it began to bleed again. Will trial gain tomorrow. Given fever, wbc and bilateral nasal packing will admit to medicine. Pt denies chest pain, sob, or trouble swallowing.  "PE: b/l rhinorockets; no resp distress; CTAB/L; s1 s2 no m/r/g abd soft/NT/ND  given prolonged bleeding in elderly female with b/l rhinorockets will need to be admitted

## 2019-11-20 NOTE — ED CDU PROVIDER SUBSEQUENT DAY NOTE - HISTORY
83 y.o female with epistaxis sent to CDU for monitoring and ENT- ENT came by this morning unable to remove and keep packing out as it began to bleed again. Will trial gain tomorrow. Given fever, wbc and bilateral nasal packing will admit to medicine. Pt denies chest pain, sob, or trouble swallowing.

## 2019-11-20 NOTE — H&P ADULT - PROBLEM SELECTOR PLAN 1
Meeting sepsis criteria Tmax 100.5, WBC 20.06, lactate 2.1, unclear etiology   -no localizing symptoms, CXR clear, UA negative  -f/u BCX  -Low suspicion for rhinorocket for source of infection as has not been in place for a  long period of time; c/w augmentin PO BID for prophylaxis   -monitor fever curve   -CBC daily to ensure leukocytosis downtrending Meeting sepsis criteria Tmax 100.5, WBC 20.06, lactate 2.1, unclear etiology, may in setting of stress response to epistaxis  -no localizing symptoms to point to source of infection, CXR clear, UA negative  -f/u BCX  -Low suspicion for rhinorocket for source of infection as has not been in place for a  long period of time; c/w keflex for prophylaxis   -monitor fever curve   -CBC daily to ensure leukocytosis downtrending

## 2019-11-20 NOTE — H&P ADULT - NSICDXPASTSURGICALHX_GEN_ALL_CORE_FT
PAST SURGICAL HISTORY:  No significant past surgical history PAST SURGICAL HISTORY:  S/P cholecystectomy >10 years ago

## 2019-11-20 NOTE — ED CDU PROVIDER INITIAL DAY NOTE - OBJECTIVE STATEMENT
Romanian speaking, suggesting grandson at bedside to translate:  84 y/o female with no pmhx presents to ED c/o b/l epistaxis x 3 days. Pt seen at [facility] and had left nostril packed and right rhinorocket placed. Pt seen in [facility] ED and had b/l rhinorockets placed. Pt bleeding well controlled. No trauma, no ASA. No fever, chills, congestion, sore throat, cp, sob, n/v, abd pain.

## 2019-11-20 NOTE — PROGRESS NOTE ADULT - SUBJECTIVE AND OBJECTIVE BOX
No acute events overnight  No further epistaxis    Vital Signs Last 24 Hrs  T(C): 37.1 (20 Nov 2019 05:27), Max: 38.1 (20 Nov 2019 01:30)  T(F): 98.7 (20 Nov 2019 05:27), Max: 100.5 (20 Nov 2019 01:30)  HR: 79 (20 Nov 2019 05:27) (79 - 99)  BP: 164/85 (20 Nov 2019 05:27) (136/72 - 190/91)  BP(mean): --  RR: 16 (20 Nov 2019 07:36) (16 - 18)  SpO2: 97% (20 Nov 2019 07:36) (95% - 98%)    Chinese speaking, NAD, resting  bilateral rhino rocket in place in each nasal cavity, no oozing around packing  OC/OP clear, no bleeding     A/P  83 F presented to ED with epistaxis    -s/p control of epistaxis with bilateral rhino rocket nasal packing  -BP control  -continue to monitor in monitored setting  -will keep rhino rocket packing in place  -will reassess rhino rockets for removal tomorrow  -keflex while nasal packing in place  -page/call if persistent, recurrent epistaxis  -will follow

## 2019-11-20 NOTE — ED CDU PROVIDER INITIAL DAY NOTE - MEDICAL DECISION MAKING DETAILS
82 y/o female with no pmhx presents to ED c/o b/l epistaxis x 3 days. Pt seen at [facility] and had left nostril packed and right rhinorocket placed. Pt seen in [facility] ED and had b/l rhinorockets placed. Pt bleeding well controlled. sent to CDU for observation and ENT reassess in AM, repeat CBC.

## 2019-11-20 NOTE — H&P ADULT - NSHPSOCIALHISTORY_GEN_ALL_CORE
Smoking: never smoker  Alcohol: denies  Recreational drug: denies  Lives with son, ambulates independently

## 2019-11-20 NOTE — H&P ADULT - ASSESSMENT
Patient is an 83 y.o F w/ no significant PMH who presented w/ acute blood loss anemia in the setting of persistent epistaxis, now found to meet sepsis criteria of unclear etiology

## 2019-11-20 NOTE — ED CDU PROVIDER SUBSEQUENT DAY NOTE - MEDICAL DECISION MAKING DETAILS
83 y.o female with epistaxis sent to CDU for monitoring and ENT- ENT came by this morning unable to remove and keep packing out as it began to bleed again. Will trial again tomorrow. Given fever, wbc and bilateral nasal packing will admit to medicine. No active bleeding.

## 2019-11-20 NOTE — H&P ADULT - ATTENDING COMMENTS
Patient seen and examined. Agree with above note by resident.    #Acute blood loss anemia - likely from epistaxis. Seen by ent s/p b/l rhinorockets. Needs continuous pulse ox monitoring and tele pa. Keflex as per ENT recommendations.     #HTN - no known history of HTN however patient does not see PCP regularly. Could be driven my acute events. needs better control given risk for bleeding. Start norvasc for now. Monitor BP. Titrate up as needed    # SIRS - fever and leukocytosis. Possible translocation? check blood cx. C/w keflex for now    Plan discussed with granddaughter at bedside Patient seen and examined. Agree with above note by resident.    #Acute blood loss anemia - likely from epistaxis. Seen by ent s/p b/l rhinorockets. Needs continuous pulse ox monitoring and tele monitoring. Keflex as per ENT recommendations.     #HTN - no known history of HTN however patient does not see PCP regularly. Could be driven my acute events. needs better control given risk for bleeding. Start norvasc for now. Monitor BP. Titrate up as needed    # SIRS - fever and leukocytosis. Possible translocation? check blood cx. C/w keflex for now    Plan discussed with granddaughter at bedside

## 2019-11-21 LAB
BACTERIA UR CULT: SIGNIFICANT CHANGE UP
SPECIMEN SOURCE: SIGNIFICANT CHANGE UP

## 2019-11-21 PROCEDURE — 99222 1ST HOSP IP/OBS MODERATE 55: CPT

## 2019-11-21 RX ADMIN — AMLODIPINE BESYLATE 5 MILLIGRAM(S): 2.5 TABLET ORAL at 05:27

## 2019-11-21 RX ADMIN — Medication 500 MILLIGRAM(S): at 05:28

## 2019-11-21 RX ADMIN — Medication 500 MILLIGRAM(S): at 17:49

## 2019-11-21 NOTE — PROGRESS NOTE ADULT - ASSESSMENT
recurrent epistaxis  now w/ packing in place as per ent  ent f/u  inc wbc likely from bleed  c/w keflex  ID eval  discussed plan w/ family at bedside

## 2019-11-21 NOTE — PROGRESS NOTE ADULT - SUBJECTIVE AND OBJECTIVE BOX
CHIEF COMPLAINT:Patient is a 83y old  Female who presents with a chief complaint of epistaxis (21 Nov 2019 15:25)    	        PAST MEDICAL & SURGICAL HISTORY:  No pertinent past medical history  S/P cholecystectomy: &gt;10 years ago          REVIEW OF SYSTEMS:  CONSTITUTIONAL: No fever, weight loss, or fatigue  EYES: No eye pain, visual disturbances, or discharge  NECK: No pain or stiffness  RESPIRATORY: No cough, wheezing, chills or hemoptysis; No Shortness of Breath  CARDIOVASCULAR: No chest pain, palpitations, passing out, dizziness, or leg swelling  GASTROINTESTINAL: No abdominal or epigastric pain      Medications:  MEDICATIONS  (STANDING):  amLODIPine   Tablet 5 milliGRAM(s) Oral daily  cephalexin 500 milliGRAM(s) Oral every 12 hours    MEDICATIONS  (PRN):    	    PHYSICAL EXAM:  T(C): 37 (11-21-19 @ 11:24), Max: 37.4 (11-21-19 @ 02:00)  HR: 79 (11-21-19 @ 11:24) (75 - 92)  BP: 120/56 (11-21-19 @ 11:24) (120/56 - 165/86)  RR: 16 (11-21-19 @ 11:24) (16 - 18)  SpO2: 95% (11-21-19 @ 11:24) (95% - 98%)  Wt(kg): --  I&O's Summary      Appearance: Normal	  HEENT:   Normal oral mucosa, PERRL, EOMI	  nasal packing in place  Cardiovascular: Normal S1 S2, No JVD,   Respiratory: Lungs clear to auscultation	  Psychiatry: A & O x 3, Mood & affect appropriate  Gastrointestinal:  Soft, Non-tender, + BS	  Skin: No rashes, No ecchymoses, No cyanosis	  Neurologic: Non-focal  Extremities: Normal range of motion, No clubbing, cyanosis or edema  Vascular: Peripheral pulses palpable 2+ bilaterally    TELEMETRY: 	    ECG:  	  RADIOLOGY:  OTHER: 	  	  LABS:	 	    CARDIAC MARKERS:                                10.8   18.48 )-----------( 222      ( 20 Nov 2019 06:45 )             33.1           proBNP:   Lipid Profile:   HgA1c:   TSH:

## 2019-11-21 NOTE — CONSULT NOTE ADULT - ASSESSMENT
83 year old presents with epistaxis requiring nasal packing now with leukocytosis.    1) Epistaxis  Nsal packing in place  Continue keflex while packing in place    2) Leukocytosis  Low fever at presentation    Blood culture without growth  CXr without pna    Epistaxis/ packing may be contributing  ? baseline WBC ( initial WBC had normal differential)    Continue to monitor CBC with differential after packing removed    Continue keflex while packing in place. No indication for broader coverage at this time

## 2019-11-21 NOTE — PROGRESS NOTE ADULT - SUBJECTIVE AND OBJECTIVE BOX
No acute events overnight  No further epistaxis  Left RR deflated this AM    Vital Signs Last 24 Hrs  T(C): 36.7 (21 Nov 2019 05:25), Max: 37.8 (20 Nov 2019 10:25)  T(F): 98 (21 Nov 2019 05:25), Max: 100 (20 Nov 2019 10:25)  HR: 75 (21 Nov 2019 05:25) (75 - 92)  BP: 142/76 (21 Nov 2019 05:25) (120/65 - 165/86)  BP(mean): --  RR: 16 (21 Nov 2019 05:25) (16 - 18)  SpO2: 95% (21 Nov 2019 05:25) (95% - 98%)    Danish speaking, NAD, resting  bilateral rhino rocket in place in each nasal cavity, no oozing around packing  OC/OP clear, no bleeding     A/P  83 F presented to ED with epistaxis    -s/p control of epistaxis with bilateral rhino rocket nasal packing, L RR now deflated  -will remove L RR in pm if no further bleeding  -BP control  -continue to monitor in monitored setting  -keflex while nasal packing in place  -page/call if persistent, recurrent epistaxis  -will follow

## 2019-11-21 NOTE — CONSULT NOTE ADULT - SUBJECTIVE AND OBJECTIVE BOX
HPI:  Patient is an 83 y.o Greek speaking F w/ no significant PMH (however has not been to a PCP in years) who presented to the ED on 0 with w/ epistasis that started  evening.     She states she was in her usual state of health and watching television when she started having bleeding from her right nostril. She denies any recent trauma, picking her nose, fever/chills, recent URI, or sick contacts. Her family initially brought her to [place] ED who placed a right rhinorocket. She was sent home but started to have left sided nasal bleeding and so she subsequently presented [place] ED where a left rhinorocket was placed. After being discharged from [place], she started having oozing around her packing which prompted her family to bring her to [facility] ED. She denies ever having bleeding episodes in the past, hematuria, blood in stool, ecchymoses, or gingival bleeding. She currently denies CP, SOB, or dizziness. She further denies taking any herbal medications, teas, aspirin, NSAIDs, or anticoagulation.     Low grade fever in ED.       PAST MEDICAL & SURGICAL HISTORY:  No pertinent past medical history  S/P cholecystectomy: &gt;10 years ago      Allergies    No Known Allergies    Intolerances        ANTIMICROBIALS:  cephalexin 500 every 12 hours      OTHER MEDS:  amLODIPine   Tablet 5 milliGRAM(s) Oral daily      SOCIAL HISTORY:    FAMILY HISTORY:  FH: type 2 diabetes mellitus: Son      REVIEW OF SYSTEMS  [  ] ROS unobtainable because:    [ x ] All other systems negative except as noted below:	    Constitutional:  [x ] fever [ ] chills  [ ] weight loss  [ ] weakness  Skin:  [ ] rash [ ] phlebitis	  Eyes: [ ] icterus [ ] pain  [ ] discharge	  ENMT: [ ] sore throat  [ ] thrush [ ] ulcers [ x] epistaxis  Respiratory: [ ] dyspnea [ ] hemoptysis [ ] cough [ ] sputum	  Cardiovascular:  [ ] chest pain [ ] palpitations [ ] edema	  Gastrointestinal:  [ ] nausea [ ] vomiting [ ] diarrhea [ ] constipation [ ] pain	  Genitourinary:  [ ] dysuria [ ] frequency [ ] hematuria [ ] discharge [ ] flank pain  [ ] incontinence  Musculoskeletal:  [ ] myalgias [ ] arthralgias [ ] arthritis  [ ] back pain  Neurological:  [ ] headache [ ] seizures  [ ] confusion/altered mental status  Psychiatric:  [ ] anxiety [ ] depression	  Hematology/Lymphatics:  [ ] lymphadenopathy  Endocrine:  [ ] adrenal [ ] thyroid  Allergic/Immunologic:	 [ ] transplant [ ] seasonal    PHYSICAL EXAM:  General: [x ] non-toxic  HEAD/EYES: [ ] PERRL [x ] white sclera [ ] icterus  ENT:  [ ] normal [ x] supple [ ] thrush [ ] pharyngeal exudate  Cardiovascular:   [ ] murmur [ x] normal [ ] PPM/AICD  Respiratory:  [x ] clear to ausculation bilaterally  GI:  [x ] soft, non-tender, normal bowel sounds  :  [ ] sanabria [ ] no CVA tenderness   Musculoskeletal:  [x ] no synovitis  Neurologic:  x[ ] non-focal exam   Skin:  [x ] no rash  Lymph: [ ]x no lymphadenopathy  Psychiatric:  [ x] appropriate affect [ ] alert & oriented  Lines:  [x ] no phlebitis [ ] central line          Drug Dosing Weight  Height (cm): 160 (2019 02:00)  Weight (kg): 71.1 (2019 02:00)  BMI (kg/m2): 27.8 (2019 02:00)  BSA (m2): 1.74 (2019 02:00)    Vital Signs Last 24 Hrs  T(F): 98.6 (19 @ 11:24), Max: 100.5 (19 @ 01:30)    Vital Signs Last 24 Hrs  HR: 79 (19 @ 11:24) (75 - 92)  BP: 120/56 (19 @ 11:24) (120/56 - 165/86)  RR: 16 (19 @ 11:24)  SpO2: 95% (19 @ 11:24) (95% - 98%)  Wt(kg): --                          10.8   18.48 )-----------( 222      ( 2019 06:45 )             33.1               Urinalysis Basic - ( 2019 02:30 )    Color: LIGHT YELLOW / Appearance: CLEAR / S.018 / pH: 7.0  Gluc: NEGATIVE / Ketone: NEGATIVE  / Bili: NEGATIVE / Urobili: NORMAL   Blood: NEGATIVE / Protein: 10 / Nitrite: NEGATIVE   Leuk Esterase: NEGATIVE / RBC: x / WBC x   Sq Epi: x / Non Sq Epi: x / Bacteria: x        MICROBIOLOGY:  Culture - Blood (19 @ 03:29)    Culture - Blood:   NO ORGANISMS ISOLATED  NO ORGANISMS ISOLATED AT 24 HOURS    Specimen Source: BLOOD VENOUS      RADIOLOGY:  < from: Xray Chest 2 Views PA/Lat (19 @ 01:52) >  EXAM:  XR CHEST PA LAT 2V        PROCEDURE DATE:  2019         INTERPRETATION:  CLINICAL INFORMATION: Fever.    EXAM: Frontal and lateral radiograph of the chest.    COMPARISON: None.    FINDINGS:  The lungs are clear.  No pleural effusion or pneumothorax.  Heart size is borderline enlarged.  Visualized osseous structures are unremarkable.    IMPRESSION: Clear lungs.    < end of copied text > HPI:  Patient is an 83 y.o German speaking F w/ no significant PMH (however has not been to a PCP in years) who presented to the ED on 0 with w/ epistasis that started  evening.     She states she was in her usual state of health and watching television when she started having bleeding from her right nostril. She denies any recent trauma, picking her nose, fever/chills, recent URI, or sick contacts. Her family initially brought her to [place] ED who placed a right rhinorocket. She was sent home but started to have left sided nasal bleeding and so she subsequently presented [place] ED where a left rhinorocket was placed. After being discharged from [place], she started having oozing around her packing which prompted her family to bring her to [facility] ED. She denies ever having bleeding episodes in the past, hematuria, blood in stool, ecchymoses, or gingival bleeding. She currently denies CP, SOB, or dizziness. She further denies taking any herbal medications, teas, aspirin, NSAIDs, or anticoagulation.     Low grade fever in ED.       PAST MEDICAL & SURGICAL HISTORY:  No pertinent past medical history  S/P cholecystectomy: &gt;10 years ago      Allergies    No Known Allergies    Intolerances        ANTIMICROBIALS:  cephalexin 500 every 12 hours      OTHER MEDS:  amLODIPine   Tablet 5 milliGRAM(s) Oral daily      SOCIAL HISTORY:  from [facility]  travel back/ forth [place] and   no tobacco    FAMILY HISTORY:  FH: type 2 diabetes mellitus: Son      REVIEW OF SYSTEMS  [  ] ROS unobtainable because:    [ x ] All other systems negative except as noted below:	    Constitutional:  [x ] fever [ ] chills  [ ] weight loss  [ ] weakness  Skin:  [ ] rash [ ] phlebitis	  Eyes: [ ] icterus [ ] pain  [ ] discharge	  ENMT: [ ] sore throat  [ ] thrush [ ] ulcers [ x] epistaxis  Respiratory: [ ] dyspnea [ ] hemoptysis [ ] cough [ ] sputum	  Cardiovascular:  [ ] chest pain [ ] palpitations [ ] edema	  Gastrointestinal:  [ ] nausea [ ] vomiting [ ] diarrhea [ ] constipation [ ] pain	  Genitourinary:  [ ] dysuria [ ] frequency [ ] hematuria [ ] discharge [ ] flank pain  [ ] incontinence  Musculoskeletal:  [ ] myalgias [ ] arthralgias [ ] arthritis  [ ] back pain  Neurological:  [ ] headache [ ] seizures  [ ] confusion/altered mental status  Psychiatric:  [ ] anxiety [ ] depression	  Hematology/Lymphatics:  [ ] lymphadenopathy  Endocrine:  [ ] adrenal [ ] thyroid  Allergic/Immunologic:	 [ ] transplant [ ] seasonal    PHYSICAL EXAM:  General: [x ] non-toxic  HEAD/EYES: [ ] PERRL [x ] white sclera [ ] icterus  ENT:  [ ] normal [ x] supple [ ] thrush [ ] pharyngeal exudate  Cardiovascular:   [ ] murmur [ x] normal [ ] PPM/AICD  Respiratory:  [x ] clear to ausculation bilaterally  GI:  [x ] soft, non-tender, normal bowel sounds  :  [ ] sanabria [ ] no CVA tenderness   Musculoskeletal:  [x ] no synovitis  Neurologic:  x[ ] non-focal exam   Skin:  [x ] no rash  Lymph: [ ]x no lymphadenopathy  Psychiatric:  [ x] appropriate affect [ ] alert & oriented  Lines:  [x ] no phlebitis [ ] central line          Drug Dosing Weight  Height (cm): 160 (2019 02:00)  Weight (kg): 71.1 (2019 02:00)  BMI (kg/m2): 27.8 (2019 02:00)  BSA (m2): 1.74 (2019 02:00)    Vital Signs Last 24 Hrs  T(F): 98.6 (19 @ 11:24), Max: 100.5 (19 @ 01:30)    Vital Signs Last 24 Hrs  HR: 79 (19 @ 11:24) (75 - 92)  BP: 120/56 (19 @ 11:24) (120/56 - 165/86)  RR: 16 (19 @ 11:24)  SpO2: 95% (19 @ 11:24) (95% - 98%)  Wt(kg): --                          10.8   18.48 )-----------( 222      ( 2019 06:45 )             33.1               Urinalysis Basic - ( 2019 02:30 )    Color: LIGHT YELLOW / Appearance: CLEAR / S.018 / pH: 7.0  Gluc: NEGATIVE / Ketone: NEGATIVE  / Bili: NEGATIVE / Urobili: NORMAL   Blood: NEGATIVE / Protein: 10 / Nitrite: NEGATIVE   Leuk Esterase: NEGATIVE / RBC: x / WBC x   Sq Epi: x / Non Sq Epi: x / Bacteria: x        MICROBIOLOGY:  Culture - Blood (19 @ 03:29)    Culture - Blood:   NO ORGANISMS ISOLATED  NO ORGANISMS ISOLATED AT 24 HOURS    Specimen Source: BLOOD VENOUS      RADIOLOGY:  < from: Xray Chest 2 Views PA/Lat (19 @ 01:52) >  EXAM:  XR CHEST PA LAT 2V        PROCEDURE DATE:  2019         INTERPRETATION:  CLINICAL INFORMATION: Fever.    EXAM: Frontal and lateral radiograph of the chest.    COMPARISON: None.    FINDINGS:  The lungs are clear.  No pleural effusion or pneumothorax.  Heart size is borderline enlarged.  Visualized osseous structures are unremarkable.    IMPRESSION: Clear lungs.    < end of copied text >

## 2019-11-22 ENCOUNTER — TRANSCRIPTION ENCOUNTER (OUTPATIENT)
Age: 84
End: 2019-11-22

## 2019-11-22 LAB
ANION GAP SERPL CALC-SCNC: 12 MMO/L — SIGNIFICANT CHANGE UP (ref 7–14)
BUN SERPL-MCNC: 11 MG/DL — SIGNIFICANT CHANGE UP (ref 7–23)
CALCIUM SERPL-MCNC: 8.9 MG/DL — SIGNIFICANT CHANGE UP (ref 8.4–10.5)
CHLORIDE SERPL-SCNC: 99 MMOL/L — SIGNIFICANT CHANGE UP (ref 98–107)
CO2 SERPL-SCNC: 25 MMOL/L — SIGNIFICANT CHANGE UP (ref 22–31)
CREAT SERPL-MCNC: 0.64 MG/DL — SIGNIFICANT CHANGE UP (ref 0.5–1.3)
GLUCOSE SERPL-MCNC: 115 MG/DL — HIGH (ref 70–99)
HCT VFR BLD CALC: 35 % — SIGNIFICANT CHANGE UP (ref 34.5–45)
HGB BLD-MCNC: 10.9 G/DL — LOW (ref 11.5–15.5)
MAGNESIUM SERPL-MCNC: 1.9 MG/DL — SIGNIFICANT CHANGE UP (ref 1.6–2.6)
MCHC RBC-ENTMCNC: 23.3 PG — LOW (ref 27–34)
MCHC RBC-ENTMCNC: 31.1 % — LOW (ref 32–36)
MCV RBC AUTO: 74.8 FL — LOW (ref 80–100)
NRBC # FLD: 0 K/UL — SIGNIFICANT CHANGE UP (ref 0–0)
PHOSPHATE SERPL-MCNC: 4 MG/DL — SIGNIFICANT CHANGE UP (ref 2.5–4.5)
PLATELET # BLD AUTO: 275 K/UL — SIGNIFICANT CHANGE UP (ref 150–400)
PMV BLD: 12.1 FL — SIGNIFICANT CHANGE UP (ref 7–13)
POTASSIUM SERPL-MCNC: 3.6 MMOL/L — SIGNIFICANT CHANGE UP (ref 3.5–5.3)
POTASSIUM SERPL-SCNC: 3.6 MMOL/L — SIGNIFICANT CHANGE UP (ref 3.5–5.3)
RBC # BLD: 4.68 M/UL — SIGNIFICANT CHANGE UP (ref 3.8–5.2)
RBC # FLD: 17.6 % — HIGH (ref 10.3–14.5)
SODIUM SERPL-SCNC: 136 MMOL/L — SIGNIFICANT CHANGE UP (ref 135–145)
WBC # BLD: 14.62 K/UL — HIGH (ref 3.8–10.5)
WBC # FLD AUTO: 14.62 K/UL — HIGH (ref 3.8–10.5)

## 2019-11-22 PROCEDURE — 99232 SBSQ HOSP IP/OBS MODERATE 35: CPT

## 2019-11-22 PROCEDURE — 93010 ELECTROCARDIOGRAM REPORT: CPT

## 2019-11-22 PROCEDURE — 99222 1ST HOSP IP/OBS MODERATE 55: CPT

## 2019-11-22 RX ORDER — METOPROLOL TARTRATE 50 MG
25 TABLET ORAL
Refills: 0 | Status: DISCONTINUED | OUTPATIENT
Start: 2019-11-22 | End: 2019-11-24

## 2019-11-22 RX ORDER — AMLODIPINE BESYLATE 2.5 MG/1
1 TABLET ORAL
Qty: 30 | Refills: 0
Start: 2019-11-22 | End: 2019-12-21

## 2019-11-22 RX ADMIN — Medication 500 MILLIGRAM(S): at 05:30

## 2019-11-22 RX ADMIN — Medication 500 MILLIGRAM(S): at 17:43

## 2019-11-22 RX ADMIN — AMLODIPINE BESYLATE 5 MILLIGRAM(S): 2.5 TABLET ORAL at 05:30

## 2019-11-22 NOTE — CHART NOTE - NSCHARTNOTEFT_GEN_A_CORE
Patient was about to be discharged and walking in the hallway.  Patient found herself feeling dizzy and nurse was able to hold on to her and sent her back to bed.  Patient denies nausea,   ICU Vital Signs Last 24 Hrs  T(C): 36.9 (22 Nov 2019 06:24), Max: 37.2 (21 Nov 2019 19:30)  T(F): 98.4 (22 Nov 2019 06:24), Max: 98.9 (21 Nov 2019 19:30)  HR: 80 (22 Nov 2019 06:24) (72 - 80)  BP: 125/68 (22 Nov 2019 06:24) (125/68 - 134/63)  BP(mean): --  ABP: --  ABP(mean): --  RR: 17 (22 Nov 2019 06:24) (16 - 18)  SpO2: 96% (22 Nov 2019 06:24) (94% - 96%)  on Tele- 3 minutes of SVT Rapid Afib, Vtachy then broke  Lungs- clear, cor- s1,s2, sinus rhythm, Abd- soft NT, Ext- neg E/C/C                        10.9   14.62 )-----------( 275      ( 22 Nov 2019 06:40 )             35.0     22 Nov 2019 06:40    136    |  99     |  11     ----------------------------<  115    3.6     |  25     |  0.64     Ca    8.9        22 Nov 2019 06:40  Phos  4.0       22 Nov 2019 06:40  Mg     1.9       22 Nov 2019 06:40    A/P--83YOF with recurrent Epistaxis and newly diagnosed HTN presented with Afib with RVR.  Vtachy-  EP consult called  PMD made aware.

## 2019-11-22 NOTE — DISCHARGE NOTE PROVIDER - NSFOLLOWUPCLINICS_GEN_ALL_ED_FT
[facility] [place]  Internal Medicine  [address] [place] [address]  Phone: [phone]  Fax: [phone]  Follow Up Time: 1 week

## 2019-11-22 NOTE — DISCHARGE NOTE PROVIDER - NSDCFUSCHEDAPPT_GEN_ALL_CORE_FT
REYESDEPEREZ, ANA C ; 02/04/2020 ; [facility] [address] [place] [address] REYESDEPEREZ, ANA ; 12/20/2019 ; [facility] [phone]  REYESDEPEREZ, ANA ; 02/04/2020 ; [facility] [address] [place] [address] REYESDEPEREZ, ANA ; 12/20/2019 ; [facility] [phone]  REYESDEPEREZ, ANA ; 02/04/2020 ; [facility] [address] [place] [address]

## 2019-11-22 NOTE — PROGRESS NOTE ADULT - SUBJECTIVE AND OBJECTIVE BOX
No acute events overnight  No further epistaxis  Yesterday left rhino rocket removed, right rhino rocket deflated    This morning right rhino rocket removed without issue    Vital Signs Last 24 Hrs  T(C): 36.6 (22 Nov 2019 11:40), Max: 37.2 (21 Nov 2019 19:30)  T(F): 97.9 (22 Nov 2019 11:40), Max: 98.9 (21 Nov 2019 19:30)  HR: 78 (22 Nov 2019 11:40) (72 - 80)  BP: 125/95 (22 Nov 2019 11:40) (125/68 - 134/63)  BP(mean): --  RR: 16 (22 Nov 2019 11:40) (16 - 18)  SpO2: 96% (22 Nov 2019 11:40) (94% - 96%)    Cambodian speaking, NAD, resting  nasal cavity clear, no bleeding  OC/OP clear, no bleeding     A/P  83 F presented to ED with epistaxis    -s/p bilateral rhino rockets  -nasal packing removed  - Nasal saline, 2 sprays to both nares 4 times a day  - Vaseline, via cotton tip to inside tip of each nostril two times day  - Strict blood pressure control  - Avoid nasal trauma; no nose rubbing, blowing or manipulating nasal packing.  Sneeze with mouth open and pinching nares.  - Avoid bending with head blow the waist.    - No heavy lifting  - May follow up in [facility] clinic. Call  to schedule follow up appointment   - [facility] will sign off  - call/page with questions

## 2019-11-22 NOTE — PROGRESS NOTE ADULT - SUBJECTIVE AND OBJECTIVE BOX
CHIEF COMPLAINT:Patient is a 83y old  Female who presents with a chief complaint of epistaxis (22 Nov 2019 10:40)    	        PAST MEDICAL & SURGICAL HISTORY:  No pertinent past medical history  S/P cholecystectomy: &gt;10 years ago          REVIEW OF SYSTEMS:  no complaints   no further bleeding     Medications:  MEDICATIONS  (STANDING):  amLODIPine   Tablet 5 milliGRAM(s) Oral daily  cephalexin 500 milliGRAM(s) Oral every 12 hours    MEDICATIONS  (PRN):    	    PHYSICAL EXAM:  T(C): 36.6 (11-22-19 @ 11:40), Max: 37.2 (11-21-19 @ 19:30)  HR: 78 (11-22-19 @ 11:40) (72 - 80)  BP: 125/95 (11-22-19 @ 11:40) (125/68 - 134/63)  RR: 16 (11-22-19 @ 11:40) (16 - 18)  SpO2: 96% (11-22-19 @ 11:40) (94% - 96%)  Wt(kg): --  I&O's Summary      Appearance: Normal	  HEENT:   Normal oral mucosa, PERRL, EOMI	  Lymphatic: No lymphadenopathy  Cardiovascular: Normal S1 S2, No JVD, No murmurs, No edema  Respiratory: Lungs clear to auscultation	  Psychiatry: A & O  Gastrointestinal:  Soft, Non-tender, + BS	  Skin: No rashes, No ecchymoses, No cyanosis	  Neurologic: Non-focal  Extremities: Normal range of motion, No clubbing, cyanosis or edema  Vascular: Peripheral pulses palpable 2+ bilaterally    TELEMETRY: 	    ECG:  	  RADIOLOGY:  OTHER: 	  	  LABS:	 	    CARDIAC MARKERS:                                10.9   14.62 )-----------( 275      ( 22 Nov 2019 06:40 )             35.0     11-22    136  |  99  |  11  ----------------------------<  115<H>  3.6   |  25  |  0.64    Ca    8.9      22 Nov 2019 06:40  Phos  4.0     11-22  Mg     1.9     11-22      proBNP:   Lipid Profile:   HgA1c:   TSH:

## 2019-11-22 NOTE — PROGRESS NOTE ADULT - SUBJECTIVE AND OBJECTIVE BOX
Follow Up:      Inverval History/ROS:Patient is a 83y old  Female who presents with a chief complaint of epistaxis (22 Nov 2019 12:38)    Nasal packing removed.  no fever    Denies cough  No diarrhea  No dysuria.    Allergies    No Known Allergies    Intolerances        ANTIMICROBIALS:  cephalexin 500 every 12 hours      OTHER MEDS:  amLODIPine   Tablet 5 milliGRAM(s) Oral daily      Vital Signs Last 24 Hrs  T(C): 36.6 (22 Nov 2019 11:40), Max: 37.2 (21 Nov 2019 19:30)  T(F): 97.9 (22 Nov 2019 11:40), Max: 98.9 (21 Nov 2019 19:30)  HR: 78 (22 Nov 2019 11:40) (72 - 80)  BP: 125/95 (22 Nov 2019 11:40) (125/68 - 134/63)  BP(mean): --  RR: 16 (22 Nov 2019 11:40) (16 - 18)  SpO2: 96% (22 Nov 2019 11:40) (94% - 96%)    PHYSICAL EXAM:  General: [x ] non-toxic  HEAD/EYES: [ ] PERRL [x ] white sclera [ ] icterus  ENT:  [ ] normal [x ] supple [ ] thrush [ ] pharyngeal exudate  Cardiovascular:   [ ] murmur [ x] normal [ ] PPM/AICD  Respiratory:  x[ ] clear to ausculation bilaterally  GI:  x[ ] soft, non-tender, normal bowel sounds  :  [ ] sanabria [ x] no CVA tenderness   Musculoskeletal:  [ ] no synovitis  Neurologic:  [ ] non-focal exam   Skin:  [x ] no rash  Lymph: [ ] no lymphadenopathy  Psychiatric:  [ ] appropriate affect [ x] alert & oriented  Lines:  [ x] no phlebitis [ ] central line                                10.9   14.62 )-----------( 275      ( 22 Nov 2019 06:40 )             35.0       11-22    136  |  99  |  11  ----------------------------<  115<H>  3.6   |  25  |  0.64    Ca    8.9      22 Nov 2019 06:40  Phos  4.0     11-22  Mg     1.9     11-22            MICROBIOLOGY:    RADIOLOGY:

## 2019-11-22 NOTE — CONSULT NOTE ADULT - SUBJECTIVE AND OBJECTIVE BOX
HISTORY OF PRESENT ILLNESS:    84 YO [place] Verdean speaking F with no known medical problems presented to the ED with epistaxis. Seen at outside hospital for epistaxis. s/p bialteral anterior nasal packing and idscharged. presented to [facility] with fever and leukocytosis. Monitored on the floors and planning for d/c today. While walking pt. had palpitations and lightheadedness. No chest pain, nausea, vomiting or weakness. Recently traveled from [place] 1 month ago. Otherwise no complaints at this time.       Allergies    No Known Allergies    Intolerances    	    MEDICATIONS:  amLODIPine   Tablet 5 milliGRAM(s) Oral daily    cephalexin 500 milliGRAM(s) Oral every 12 hours                PAST MEDICAL & SURGICAL HISTORY:  No pertinent past medical history  S/P cholecystectomy: &gt;10 years ago      FAMILY HISTORY:  FH: type 2 diabetes mellitus: Son      SOCIAL HISTORY:    [ ] Non-smoker  [ ] Smoker  [ ] Alcohol      REVIEW OF SYSTEMS:  General: no fatigue/malaise, weight loss/gain.  Skin: no rashes.  Ophthalmologic: no blurred vision, no loss of vision. 	  ENT: no sore throat, rhinorrhea, sinus congestion.  Respiratory: no SOB, cough or wheeze.  Gastrointestinal:  no N/V/D, no melena/hematemesis/hematochezia.  Genitourinary: no dysuria/hesitancy or hematuria.  Musculoskeletal: no myalgias or arthralgias.  Neurological: no changes in vision or hearing, no lightheadedness/dizziness, no syncope/near syncope	  Psychiatric: no unusual stress/anxiety.   Hematology/Lymphatics: no unusual bleeding, bruising and no lymphadenopathy.  Endocrine: no unusual thirst.   All others negative except as stated above and in HPI.    PHYSICAL EXAM:  T(C): 36.6 (11-22-19 @ 11:40), Max: 37.2 (11-21-19 @ 19:30)  HR: 78 (11-22-19 @ 11:40) (72 - 80)  BP: 125/95 (11-22-19 @ 11:40) (125/68 - 134/63)  RR: 16 (11-22-19 @ 11:40) (16 - 18)  SpO2: 96% (11-22-19 @ 11:40) (94% - 96%)  Wt(kg): --  I&O's Summary      Appearance: Normal	  HEENT:   Normal oral mucosa, PERRL, EOMI	  Lymphatic: No lymphadenopathy  Cardiovascular: Normal S1 S2, No JVD, No murmurs, No edema  Respiratory: Lungs clear to auscultation	  Psychiatry: A & O x 3, Mood & affect appropriate  Gastrointestinal:  Soft, Non-tender, + BS	  Skin: No rashes, No ecchymoses, No cyanosis	  Neurologic: Non-focal  Extremities: Normal range of motion, No clubbing, cyanosis or edema  Vascular: Peripheral pulses palpable 2+ bilaterally        LABS:	 	    CBC Full  -  ( 22 Nov 2019 06:40 )  WBC Count : 14.62 K/uL  Hemoglobin : 10.9 g/dL  Hematocrit : 35.0 %  Platelet Count - Automated : 275 K/uL  Mean Cell Volume : 74.8 fL  Mean Cell Hemoglobin : 23.3 pg  Mean Cell Hemoglobin Concentration : 31.1 %  Auto Neutrophil # : x  Auto Lymphocyte # : x  Auto Monocyte # : x  Auto Eosinophil # : x  Auto Basophil # : x  Auto Neutrophil % : x  Auto Lymphocyte % : x  Auto Monocyte % : x  Auto Eosinophil % : x  Auto Basophil % : x    11-22    136  |  99  |  11  ----------------------------<  115<H>  3.6   |  25  |  0.64    Ca    8.9      22 Nov 2019 06:40  Phos  4.0     11-22  Mg     1.9     11-22        proBNP:   Lipid Profile:   HgA1c:   TSH:       CARDIAC MARKERS:        TELEMETRY: regularly irregular tachycardia lasting 2 minutes during episode  ECG:  	NSR  RADIOLOGY:  OTHER: 	    PREVIOUS DIAGNOSTIC TESTING:    [ ] Echocardiogram:  [ ]  Catheterization:  [ ] Stress Test:  	  	  ASSESSMENT/PLAN: 	  84 YO F with recent epistaxis found to be febrile and leukocytosis. Monitored and given abx but low suspicion for infectious etiology. Planned for d/c today but while ambulating pt. became tachycardic.     Tele strip with Afib RVR and some abhorrent conduction.   would trend cbc given notable drop since admission  pt. asymptomatic at this time  No need for rate control at this time  transthoracic echo to r/o structural heart disease  Can consider anticoagulation after nose bleed resolves as outpatient  Will discuss with attending.         Maykel Simons  Cardiology Fellow  [phone]  All Cardiology service information can be found 24/7 on [email or web address], password: [email or web address] HISTORY OF PRESENT ILLNESS:    84 YO Belgian speaking F with no known medical problems presented to the ED with epistaxis. Seen at outside hospital for epistaxis. s/p bialteral anterior nasal packing and idscharged. presented to [facility] with fever and leukocytosis. Monitored on the floors and planning for d/c today. While walking pt. had palpitations and lightheadedness. No chest pain, nausea, vomiting or weakness. Recently traveled from [place] 1 month ago. Otherwise no complaints at this time.       Allergies    No Known Allergies    Intolerances    	    MEDICATIONS:  amLODIPine   Tablet 5 milliGRAM(s) Oral daily    cephalexin 500 milliGRAM(s) Oral every 12 hours                PAST MEDICAL & SURGICAL HISTORY:  No pertinent past medical history  S/P cholecystectomy: &gt;10 years ago      FAMILY HISTORY:  FH: type 2 diabetes mellitus: Son      SOCIAL HISTORY:    [ ] Non-smoker  [ ] Smoker  [ ] Alcohol      REVIEW OF SYSTEMS:  General: no fatigue/malaise, weight loss/gain.  Skin: no rashes.  Ophthalmologic: no blurred vision, no loss of vision. 	  ENT: no sore throat, rhinorrhea, sinus congestion.  Respiratory: no SOB, cough or wheeze.  Gastrointestinal:  no N/V/D, no melena/hematemesis/hematochezia.  Genitourinary: no dysuria/hesitancy or hematuria.  Musculoskeletal: no myalgias or arthralgias.  Neurological: no changes in vision or hearing, no lightheadedness/dizziness, no syncope/near syncope	  Psychiatric: no unusual stress/anxiety.   Hematology/Lymphatics: no unusual bleeding, bruising and no lymphadenopathy.  Endocrine: no unusual thirst.   All others negative except as stated above and in HPI.    PHYSICAL EXAM:  T(C): 36.6 (11-22-19 @ 11:40), Max: 37.2 (11-21-19 @ 19:30)  HR: 78 (11-22-19 @ 11:40) (72 - 80)  BP: 125/95 (11-22-19 @ 11:40) (125/68 - 134/63)  RR: 16 (11-22-19 @ 11:40) (16 - 18)  SpO2: 96% (11-22-19 @ 11:40) (94% - 96%)  Wt(kg): --  I&O's Summary      Appearance: Normal	  HEENT:   Normal oral mucosa, PERRL, EOMI	  Lymphatic: No lymphadenopathy  Cardiovascular: Normal S1 S2, No JVD, No murmurs, No edema  Respiratory: Lungs clear to auscultation	  Psychiatry: A & O x 3, Mood & affect appropriate  Gastrointestinal:  Soft, Non-tender, + BS	  Skin: No rashes, No ecchymoses, No cyanosis	  Neurologic: Non-focal  Extremities: Normal range of motion, No clubbing, cyanosis or edema  Vascular: Peripheral pulses palpable 2+ bilaterally        LABS:	 	    CBC Full  -  ( 22 Nov 2019 06:40 )  WBC Count : 14.62 K/uL  Hemoglobin : 10.9 g/dL  Hematocrit : 35.0 %  Platelet Count - Automated : 275 K/uL  Mean Cell Volume : 74.8 fL  Mean Cell Hemoglobin : 23.3 pg  Mean Cell Hemoglobin Concentration : 31.1 %  Auto Neutrophil # : x  Auto Lymphocyte # : x  Auto Monocyte # : x  Auto Eosinophil # : x  Auto Basophil # : x  Auto Neutrophil % : x  Auto Lymphocyte % : x  Auto Monocyte % : x  Auto Eosinophil % : x  Auto Basophil % : x    11-22    136  |  99  |  11  ----------------------------<  115<H>  3.6   |  25  |  0.64    Ca    8.9      22 Nov 2019 06:40  Phos  4.0     11-22  Mg     1.9     11-22        proBNP:   Lipid Profile:   HgA1c:   TSH:       CARDIAC MARKERS:        TELEMETRY: regularly irregular tachycardia lasting 2 minutes during episode  ECG:  	NSR  RADIOLOGY:  OTHER: 	    PREVIOUS DIAGNOSTIC TESTING:    [ ] Echocardiogram:  [ ]  Catheterization:  [ ] Stress Test:  	  	  ASSESSMENT/PLAN: 	  84 YO F with recent epistaxis found to be febrile and leukocytosis. Monitored and given abx but low suspicion for infectious etiology. Planned for d/c today but while ambulating pt. became tachycardic.     Tele strip with Afib RVR and some abhorrent conduction.   would trend cbc given notable drop since admission  pt. asymptomatic at this time  Would start 25mg PO metoprolol  transthoracic echo to r/o structural heart disease  Can consider anticoagulation after nose bleed resolves as outpatient        Maykel Simons  Cardiology Fellow  [phone]  All Cardiology service information can be found 24/7 on [email or web address], password: [email or web address] HISTORY OF PRESENT ILLNESS:    82 YO Vietnamese speaking F with no known medical problems presented to the ED with epistaxis. Seen at outside hospital for epistaxis. s/p bialteral anterior nasal packing and idscharged. presented to [facility] with fever and leukocytosis. Monitored on the floors and planning for d/c today. While walking pt. had palpitations and lightheadedness. No chest pain, nausea, vomiting or weakness. Recently traveled from [place] 1 month ago. Otherwise no complaints at this time.       Allergies    No Known Allergies    Intolerances    	    MEDICATIONS:  amLODIPine   Tablet 5 milliGRAM(s) Oral daily    cephalexin 500 milliGRAM(s) Oral every 12 hours                PAST MEDICAL & SURGICAL HISTORY:  No pertinent past medical history  S/P cholecystectomy: &gt;10 years ago      FAMILY HISTORY:  FH: type 2 diabetes mellitus: Son      SOCIAL HISTORY:    [ ] Non-smoker  [ ] Smoker  [ ] Alcohol      REVIEW OF SYSTEMS:  General: no fatigue/malaise, weight loss/gain.  Skin: no rashes.  Ophthalmologic: no blurred vision, no loss of vision. 	  ENT: no sore throat, rhinorrhea, sinus congestion.  Respiratory: no SOB, cough or wheeze.  Gastrointestinal:  no N/V/D, no melena/hematemesis/hematochezia.  Genitourinary: no dysuria/hesitancy or hematuria.  Musculoskeletal: no myalgias or arthralgias.  Neurological: no changes in vision or hearing, no lightheadedness/dizziness, no syncope/near syncope	  Psychiatric: no unusual stress/anxiety.   Hematology/Lymphatics: no unusual bleeding, bruising and no lymphadenopathy.  Endocrine: no unusual thirst.   All others negative except as stated above and in HPI.    PHYSICAL EXAM:  T(C): 36.6 (11-22-19 @ 11:40), Max: 37.2 (11-21-19 @ 19:30)  HR: 78 (11-22-19 @ 11:40) (72 - 80)  BP: 125/95 (11-22-19 @ 11:40) (125/68 - 134/63)  RR: 16 (11-22-19 @ 11:40) (16 - 18)  SpO2: 96% (11-22-19 @ 11:40) (94% - 96%)  Wt(kg): --  I&O's Summary      Appearance: Normal	  HEENT:   Normal oral mucosa, PERRL, EOMI	  Lymphatic: No lymphadenopathy  Cardiovascular: Normal S1 S2, No JVD, No murmurs, No edema  Respiratory: Lungs clear to auscultation	  Psychiatry: A & O x 3, Mood & affect appropriate  Gastrointestinal:  Soft, Non-tender, + BS	  Skin: No rashes, No ecchymoses, No cyanosis	  Neurologic: Non-focal  Extremities: Normal range of motion, No clubbing, cyanosis or edema  Vascular: Peripheral pulses palpable 2+ bilaterally        LABS:	 	    CBC Full  -  ( 22 Nov 2019 06:40 )  WBC Count : 14.62 K/uL  Hemoglobin : 10.9 g/dL  Hematocrit : 35.0 %  Platelet Count - Automated : 275 K/uL  Mean Cell Volume : 74.8 fL  Mean Cell Hemoglobin : 23.3 pg  Mean Cell Hemoglobin Concentration : 31.1 %  Auto Neutrophil # : x  Auto Lymphocyte # : x  Auto Monocyte # : x  Auto Eosinophil # : x  Auto Basophil # : x  Auto Neutrophil % : x  Auto Lymphocyte % : x  Auto Monocyte % : x  Auto Eosinophil % : x  Auto Basophil % : x    11-22    136  |  99  |  11  ----------------------------<  115<H>  3.6   |  25  |  0.64    Ca    8.9      22 Nov 2019 06:40  Phos  4.0     11-22  Mg     1.9     11-22        proBNP:   Lipid Profile:   HgA1c:   TSH:       CARDIAC MARKERS:        TELEMETRY: regularly irregular tachycardia lasting 2 minutes during episode  ECG:  	NSR  RADIOLOGY:  OTHER: 	    PREVIOUS DIAGNOSTIC TESTING:    [ ] Echocardiogram:  [ ]  Catheterization:  [ ] Stress Test:  	  	  ASSESSMENT/PLAN: 	  82 YO F with recent epistaxis found to be febrile and leukocytosis. Monitored and given abx but low suspicion for infectious etiology. Planned for d/c today but while ambulating pt. became tachycardic.     Tele strip with Afib RVR and some aberrent conduction.   would trend cbc given notable drop since admission  pt. asymptomatic at this time  Would start 25mg PO metoprolol  transthoracic echo to r/o structural heart disease  Can consider anticoagulation after nose bleed resolves as outpatient        Maykel Simons  Cardiology Fellow  [phone]  All Cardiology service information can be found 24/7 on [email or web address], password: [email or web address]

## 2019-11-22 NOTE — PROVIDER CONTACT NOTE (OTHER) - ASSESSMENT
patient was ambulating in the hallway- complained of dizziness- patient went into SVT 200s- patient was instructed to vasovagal and HR went back to NSR

## 2019-11-22 NOTE — DISCHARGE NOTE PROVIDER - NSDCCPCAREPLAN_GEN_ALL_CORE_FT
PRINCIPAL DISCHARGE DIAGNOSIS  Diagnosis: Epistaxis  Assessment and Plan of Treatment: no further bleeding.  Needs to follow up with physician      SECONDARY DISCHARGE DIAGNOSES  Diagnosis: Hypertension  Assessment and Plan of Treatment: To maintain a normal blood pressure to prevent heart attack, stroke and no protein restriction, no conc Potassium, No conc phosphate failure.   Low sodium and fat low salt &  low cholesterol, continue anti angina meds-hypertensive medications, and follow up with physician PRINCIPAL DISCHARGE DIAGNOSIS  Diagnosis: Epistaxis  Assessment and Plan of Treatment: -Nasal saline, 2 sprays to both nares 4 times a day  - Vaseline, via cotton tip to inside tip of each nostril two times day  - Strict blood pressure control  - Avoid nasal trauma; no nose rubbing, blowing or manipulating nasal packing.  Sneeze with mouth open and pinching nares.  - Avoid bending with head blow the waist.    - No heavy lifting  - May follow up in ORL clinic. Call  to schedule follow up appointment      SECONDARY DISCHARGE DIAGNOSES  Diagnosis: Tachycardia  Assessment and Plan of Treatment: Your heart rate was found to be elevated so you were started on metoprolol to lower your heart rate. You also had a loop recorder placed to  any abnormal heart rhythms.    Diagnosis: Hypertension  Assessment and Plan of Treatment: Low sodium and fat diet, continue anti-hypertensive medications, and follow up with primary care physician. You were started on amlodipine. PRINCIPAL DISCHARGE DIAGNOSIS  Diagnosis: Epistaxis  Assessment and Plan of Treatment: -Nasal saline, 2 sprays to both nares 4 times a day  - Vaseline, via cotton tip to inside tip of each nostril two times day  - Strict blood pressure control  - Avoid nasal trauma; no nose rubbing, blowing or manipulating nasal packing.  Sneeze with mouth open and pinching nares.  - Avoid bending with head blow the waist.    - No heavy lifting  - May follow up in ORL clinic. Call  to schedule follow up appointment      SECONDARY DISCHARGE DIAGNOSES  Diagnosis: Tachycardia  Assessment and Plan of Treatment: Your heart rate was found to be elevated so you were started on metoprolol to lower your heart rate. You also had a loop recorder placed to  any abnormal heart rhythms. You will follow up at the device clinic on December 20, 2019 at 12:30p. The device clinic is located in the ambulatory care building on the fourth floor.    Diagnosis: Hypertension  Assessment and Plan of Treatment: Low sodium and fat diet, continue anti-hypertensive medications, and follow up with primary care physician. You were started on amlodipine.

## 2019-11-22 NOTE — DISCHARGE NOTE PROVIDER - CARE PROVIDER_API CALL
Burt Chaudhry)  [facility]  [address], [address] [place] [address]  Phone: [phone]  Fax: [phone]  Follow Up Time: 1 week

## 2019-11-22 NOTE — PROGRESS NOTE ADULT - ASSESSMENT
83 year old presents with epistaxis requiring nasal packing now with leukocytosis.    1) Epistaxis  Packing removed    Can stop kelfex    2) Leukocytosis  Low fever at presentation    Blood culture without growth  CXr without pna  wbc improved    Repeat CBC as outpt with pmd

## 2019-11-22 NOTE — PROGRESS NOTE ADULT - ASSESSMENT
recurrent epistaxis  packing removed   ent f/u/cleared   inc wbc better  ID eval noted  svt /ep eval to clear for d/c  discussed plan w/ family at bedside

## 2019-11-22 NOTE — DISCHARGE NOTE PROVIDER - NSDCMRMEDTOKEN_GEN_ALL_CORE_FT
amLODIPine 5 mg oral tablet: 1 tab(s) orally once a day amLODIPine 5 mg oral tablet: 1 tab(s) orally once a day  metoprolol tartrate 25 mg oral tablet: 0.5 tab(s) orally 2 times a day   sodium chloride 0.65% nasal spray: 1 spray(s) nasal 2 times a day

## 2019-11-22 NOTE — DISCHARGE NOTE PROVIDER - HOSPITAL COURSE
83YOF without any medical history found to have acute blood loss in the setting recurrent epistaxis.  found to meet sepsis critieria of unclear etiologyrecurrent epistaxis    Patient had packing and rockets and they were removed by ENT.  No epistaxis seen.    ent f/u    inc wbc likely from bleed    c/w keflex    ID eval    discussed plan w/ family at bedside    will Discontinue antibiotic upon discharge.  Patient needs to have follow up with physician for further work up.          Patient found to have Hypertension.      Amlodipine started.  Needs to follow up as outpatient.    Patient is hemodynamically stable and without complaints and patient is ready for discharge.  Case discussed and medications reviewed with patient and PMD.  Agreed with above.  Medications needed sent to pharmacy. 82 YO F with recent epistaxis found to be febrile and leukocytosis. s/p bilateral rhino rockets    and packing.  Monitored and given abx but low suspicion for infectious etiology. Planned for d/c but while ambulating pt. became tachycardic.    Tele strip with Afib RVR and some aberrent conduction.     Epistaxis stopped. Pt had an ILR implanted. TTE unremarkable. Pt is now medically cleared for discharge home.

## 2019-11-22 NOTE — DISCHARGE NOTE NURSING/CASE MANAGEMENT/SOCIAL WORK - PATIENT PORTAL LINK FT
You can access the FollowMyHealth Patient Portal offered by [facility] by registering at the following website: [email or web address]. By joining [facility]’s FollowMyHealth portal, you will also be able to view your health information using other applications (apps) compatible with our system.

## 2019-11-23 LAB
ANION GAP SERPL CALC-SCNC: 9 MMO/L — SIGNIFICANT CHANGE UP (ref 7–14)
BASOPHILS # BLD AUTO: 0.07 K/UL — SIGNIFICANT CHANGE UP (ref 0–0.2)
BASOPHILS NFR BLD AUTO: 0.6 % — SIGNIFICANT CHANGE UP (ref 0–2)
BUN SERPL-MCNC: 11 MG/DL — SIGNIFICANT CHANGE UP (ref 7–23)
CALCIUM SERPL-MCNC: 8.5 MG/DL — SIGNIFICANT CHANGE UP (ref 8.4–10.5)
CHLORIDE SERPL-SCNC: 102 MMOL/L — SIGNIFICANT CHANGE UP (ref 98–107)
CO2 SERPL-SCNC: 28 MMOL/L — SIGNIFICANT CHANGE UP (ref 22–31)
CREAT SERPL-MCNC: 0.64 MG/DL — SIGNIFICANT CHANGE UP (ref 0.5–1.3)
EOSINOPHIL # BLD AUTO: 0.24 K/UL — SIGNIFICANT CHANGE UP (ref 0–0.5)
EOSINOPHIL NFR BLD AUTO: 2 % — SIGNIFICANT CHANGE UP (ref 0–6)
GLUCOSE SERPL-MCNC: 100 MG/DL — HIGH (ref 70–99)
HCT VFR BLD CALC: 32.9 % — LOW (ref 34.5–45)
HGB BLD-MCNC: 10.3 G/DL — LOW (ref 11.5–15.5)
IMM GRANULOCYTES NFR BLD AUTO: 0.4 % — SIGNIFICANT CHANGE UP (ref 0–1.5)
LYMPHOCYTES # BLD AUTO: 27.1 % — SIGNIFICANT CHANGE UP (ref 13–44)
LYMPHOCYTES # BLD AUTO: 3.27 K/UL — SIGNIFICANT CHANGE UP (ref 1–3.3)
MAGNESIUM SERPL-MCNC: 2.1 MG/DL — SIGNIFICANT CHANGE UP (ref 1.6–2.6)
MCHC RBC-ENTMCNC: 23.8 PG — LOW (ref 27–34)
MCHC RBC-ENTMCNC: 31.3 % — LOW (ref 32–36)
MCV RBC AUTO: 76 FL — LOW (ref 80–100)
MONOCYTES # BLD AUTO: 1.05 K/UL — HIGH (ref 0–0.9)
MONOCYTES NFR BLD AUTO: 8.7 % — SIGNIFICANT CHANGE UP (ref 2–14)
NEUTROPHILS # BLD AUTO: 7.37 K/UL — SIGNIFICANT CHANGE UP (ref 1.8–7.4)
NEUTROPHILS NFR BLD AUTO: 61.2 % — SIGNIFICANT CHANGE UP (ref 43–77)
NRBC # FLD: 0 K/UL — SIGNIFICANT CHANGE UP (ref 0–0)
PLATELET # BLD AUTO: 300 K/UL — SIGNIFICANT CHANGE UP (ref 150–400)
PMV BLD: 12.4 FL — SIGNIFICANT CHANGE UP (ref 7–13)
POTASSIUM SERPL-MCNC: 4.1 MMOL/L — SIGNIFICANT CHANGE UP (ref 3.5–5.3)
POTASSIUM SERPL-SCNC: 4.1 MMOL/L — SIGNIFICANT CHANGE UP (ref 3.5–5.3)
RBC # BLD: 4.33 M/UL — SIGNIFICANT CHANGE UP (ref 3.8–5.2)
RBC # FLD: 17.4 % — HIGH (ref 10.3–14.5)
SODIUM SERPL-SCNC: 139 MMOL/L — SIGNIFICANT CHANGE UP (ref 135–145)
WBC # BLD: 12.05 K/UL — HIGH (ref 3.8–10.5)
WBC # FLD AUTO: 12.05 K/UL — HIGH (ref 3.8–10.5)

## 2019-11-23 PROCEDURE — 93306 TTE W/DOPPLER COMPLETE: CPT | Mod: 26

## 2019-11-23 RX ORDER — SODIUM CHLORIDE 0.65 %
1 AEROSOL, SPRAY (ML) NASAL
Refills: 0 | Status: DISCONTINUED | OUTPATIENT
Start: 2019-11-23 | End: 2019-11-25

## 2019-11-23 RX ADMIN — AMLODIPINE BESYLATE 5 MILLIGRAM(S): 2.5 TABLET ORAL at 05:20

## 2019-11-23 RX ADMIN — Medication 25 MILLIGRAM(S): at 05:20

## 2019-11-23 RX ADMIN — Medication 25 MILLIGRAM(S): at 17:20

## 2019-11-23 RX ADMIN — Medication 1 SPRAY(S): at 22:32

## 2019-11-23 NOTE — PROVIDER CONTACT NOTE (CHANGE IN STATUS NOTIFICATION) - SITUATION
Pt here status post epitaxis, with episode of tachycardia yesterday.  Now with burst of SVT this am.

## 2019-11-23 NOTE — PROGRESS NOTE ADULT - SUBJECTIVE AND OBJECTIVE BOX
CHIEF COMPLAINT:Patient is a 83y old  Female who presents with a chief complaint of epistaxis (22 Nov 2019 13:43)    	        PAST MEDICAL & SURGICAL HISTORY:  No pertinent past medical history  S/P cholecystectomy: &gt;10 years ago          REVIEW OF SYSTEMS:  CONSTITUTIONAL: No fever, weight loss, or fatigue  EYES: No eye pain, visual disturbances, or discharge  NECK: No pain or stiffness  RESPIRATORY: No cough, wheezing, chills or hemoptysis; No Shortness of Breath  CARDIOVASCULAR: No chest pain, palpitations, passing out, dizziness, or leg swelling  GASTROINTESTINAL: No abdominal or epigastric pain. No nausea, vomiting, or hematemesis; No diarrhea or constipation. No melena or hematochezia.  GENITOURINARY: No dysuria, frequency, hematuria, or incontinence  NEUROLOGICAL: No headaches, memory loss, loss of strength, numbness, or tremors  SKIN: No itching, burning, rashes, or lesions   LYMPH Nodes: No enlarged glands  ENDOCRINE: No heat or cold intolerance; No hair loss  MUSCULOSKELETAL: No joint pain or swelling; No muscle, back, or extremity pain    Medications:  MEDICATIONS  (STANDING):  amLODIPine   Tablet 5 milliGRAM(s) Oral daily  metoprolol tartrate 25 milliGRAM(s) Oral two times a day    MEDICATIONS  (PRN):    	    PHYSICAL EXAM:  T(C): 37.1 (11-23-19 @ 05:20), Max: 37.3 (11-22-19 @ 20:05)  HR: 64 (11-23-19 @ 05:20) (64 - 130)  BP: 145/68 (11-23-19 @ 05:20) (124/65 - 154/66)  RR: 16 (11-23-19 @ 05:20) (16 - 16)  SpO2: 97% (11-23-19 @ 05:20) (94% - 97%)  Wt(kg): --  I&O's Summary      Appearance: Normal	  HEENT:   Normal oral mucosa, PERRL, EOMI	  Lymphatic: No lymphadenopathy  Cardiovascular: Normal S1 S2, No JVD, No murmurs, No edema  Respiratory: Lungs clear to auscultation	  Psychiatry: A & O x 3, Mood & affect appropriate  Gastrointestinal:  Soft, Non-tender, + BS	  Skin: No rashes, No ecchymoses, No cyanosis	  Neurologic: Non-focal  Extremities: Normal range of motion, No clubbing, cyanosis or edema  Vascular: Peripheral pulses palpable 2+ bilaterally    TELEMETRY: 	    ECG:  	  RADIOLOGY:  OTHER: 	  	  LABS:	 	    CARDIAC MARKERS:                                10.3   12.05 )-----------( 300      ( 23 Nov 2019 06:10 )             32.9     11-23    139  |  102  |  11  ----------------------------<  100<H>  4.1   |  28  |  0.64    Ca    8.5      23 Nov 2019 06:10  Phos  4.0     11-22  Mg     2.1     11-23      proBNP:   Lipid Profile:   HgA1c:   TSH:

## 2019-11-23 NOTE — PROGRESS NOTE ADULT - ASSESSMENT
recurrent epistaxis  packing removed   ent f/u/cleared   inc wbc better  ID eval noted  svt /ep eval noted  echo inpt vs outpt  c/w b b lockers/

## 2019-11-24 PROCEDURE — 99232 SBSQ HOSP IP/OBS MODERATE 35: CPT | Mod: GC

## 2019-11-24 RX ORDER — METOPROLOL TARTRATE 50 MG
12.5 TABLET ORAL
Refills: 0 | Status: DISCONTINUED | OUTPATIENT
Start: 2019-11-24 | End: 2019-11-25

## 2019-11-24 RX ADMIN — AMLODIPINE BESYLATE 5 MILLIGRAM(S): 2.5 TABLET ORAL at 05:54

## 2019-11-24 RX ADMIN — Medication 12.5 MILLIGRAM(S): at 18:05

## 2019-11-24 RX ADMIN — Medication 1 SPRAY(S): at 05:54

## 2019-11-24 RX ADMIN — Medication 1 SPRAY(S): at 18:05

## 2019-11-24 NOTE — PROGRESS NOTE ADULT - SUBJECTIVE AND OBJECTIVE BOX
CHIEF COMPLAINT:Patient is a 83y old  Female who presents with a chief complaint of epistaxis (24 Nov 2019 11:00)    	        PAST MEDICAL & SURGICAL HISTORY:  No pertinent past medical history  S/P cholecystectomy: &gt;10 years ago          REVIEW OF SYSTEMS:  CONSTITUTIONAL: No fever, weight loss, or fatigue  EYES: No eye pain, visual disturbances, or discharge  NECK: No pain or stiffness  RESPIRATORY: No cough, wheezing, chills or hemoptysis; No Shortness of Breath  CARDIOVASCULAR: No chest pain, palpitations, passing out, dizziness, or leg swelling  GASTROINTESTINAL: No abdominal or epigastric pain. No nausea, vomiting, or hematemesis; No diarrhea or constipation. No melena or hematochezia.  GENITOURINARY: No dysuria, frequency, hematuria, or incontinence  NEUROLOGICAL: No headaches, memory loss, loss of strength, numbness, or tremors    MUSCULOSKELETAL: No joint pain or swelling; No muscle, back, or extremity pain    Medications:  MEDICATIONS  (STANDING):  amLODIPine   Tablet 5 milliGRAM(s) Oral daily  metoprolol tartrate 12.5 milliGRAM(s) Oral two times a day    MEDICATIONS  (PRN):  sodium chloride 0.65% Nasal 1 Spray(s) Right Nostril two times a day PRN Nasal Congestion    	    PHYSICAL EXAM:  T(C): 36.7 (11-24-19 @ 12:45), Max: 36.7 (11-23-19 @ 20:25)  HR: 68 (11-24-19 @ 12:45) (57 - 68)  BP: 131/58 (11-24-19 @ 12:45) (128/51 - 133/58)  RR: 16 (11-24-19 @ 12:45) (16 - 17)  SpO2: 95% (11-24-19 @ 12:45) (95% - 98%)  Wt(kg): --  I&O's Summary      Appearance: Normal	  HEENT:   Normal oral mucosa, PERRL, EOMI	  Lymphatic: No lymphadenopathy  Cardiovascular: Normal S1 S2, No JVD, No murmurs, No edema  Respiratory: Lungs clear to auscultation	  Psychiatry: A & O x 3,  Gastrointestinal:  Soft, Non-tender, + BS	  Skin: No rashes, No ecchymoses, No cyanosis	  Neurologic: Non-focal  Extremities: Normal range of motion, No clubbing, cyanosis or edema  Vascular: Peripheral pulses palpable 2+ bilaterally    TELEMETRY: 	    ECG:  	  RADIOLOGY:  OTHER: 	  	  LABS:	 	    CARDIAC MARKERS:                                10.3   12.05 )-----------( 300      ( 23 Nov 2019 06:10 )             32.9     11-23    139  |  102  |  11  ----------------------------<  100<H>  4.1   |  28  |  0.64    Ca    8.5      23 Nov 2019 06:10  Mg     2.1     11-23      proBNP:   Lipid Profile:   HgA1c:   TSH:

## 2019-11-24 NOTE — PROGRESS NOTE ADULT - SUBJECTIVE AND OBJECTIVE BOX
Overnight Events:   NAEON      Medications:  amLODIPine   Tablet 5 milliGRAM(s) Oral daily  metoprolol tartrate 12.5 milliGRAM(s) Oral two times a day  sodium chloride 0.65% Nasal 1 Spray(s) Right Nostril two times a day PRN      PAST MEDICAL & SURGICAL HISTORY:  No pertinent past medical history  S/P cholecystectomy: &gt;10 years ago      Vitals:  T(F): 97.8 (11-24), Max: 98 (11-23)  HR: 57 (11-24) (57 - 66)  BP: 133/58 (11-24) (124/62 - 137/60)  RR: 16 (11-24)  SpO2: 95% (11-24)  I&O's Summary      Physical Exam:  Appearance: No acute distress; well appearing  Eyes: PERRL, EOMI, pink conjunctiva  HENT: Normal oral muscosa  Cardiovascular: RRR, S1, S2, no murmurs, rubs, or gallops; no edema; no JVD  Respiratory: Clear to auscultation bilaterally  Gastrointestinal: soft, non-tender, non-distended with normal bowel sounds  Musculoskeletal: No clubbing; no joint deformity   Neurologic: Non-focal  Lymphatic: No lymphadenopathy  Psychiatry: AAOx3, mood & affect appropriate  Skin: No rashes, ecchymoses, or cyanosis                          10.3   12.05 )-----------( 300      ( 23 Nov 2019 06:10 )             32.9     11-23    139  |  102  |  11  ----------------------------<  100<H>  4.1   |  28  |  0.64    Ca    8.5      23 Nov 2019 06:10  Mg     2.1     11-23

## 2019-11-24 NOTE — PROGRESS NOTE ADULT - ASSESSMENT
84 YO F with recent epistaxis found to be febrile and leukocytosis. Monitored and given abx but low suspicion for infectious etiology. Planned for d/c but while ambulating pt. became tachycardic.     Tele strip with Afib RVR and some aberrent conduction.   Epistaxis stopped  pt. asymptomatic at this time  Continue metoprolol 12.5mg PO BID  TTE without valvular abnormalitiy   Plan for ILR on Monday

## 2019-11-24 NOTE — PROGRESS NOTE ADULT - ASSESSMENT
recurrent epistaxis resolved  packing removed   ent f/u/cleared   inc wbc better  ID eval noted   /ep eval noted for arrythmia  echo noted   ILD in am as per ep

## 2019-11-25 VITALS
TEMPERATURE: 99 F | RESPIRATION RATE: 18 BRPM | HEART RATE: 62 BPM | SYSTOLIC BLOOD PRESSURE: 137 MMHG | OXYGEN SATURATION: 96 % | DIASTOLIC BLOOD PRESSURE: 60 MMHG

## 2019-11-25 PROBLEM — Z78.9 OTHER SPECIFIED HEALTH STATUS: Chronic | Status: ACTIVE | Noted: 2019-11-19

## 2019-11-25 LAB
ANION GAP SERPL CALC-SCNC: 12 MMO/L — SIGNIFICANT CHANGE UP (ref 7–14)
APTT BLD: 31.4 SEC — SIGNIFICANT CHANGE UP (ref 27.5–36.3)
BACTERIA BLD CULT: SIGNIFICANT CHANGE UP
BACTERIA BLD CULT: SIGNIFICANT CHANGE UP
BUN SERPL-MCNC: 10 MG/DL — SIGNIFICANT CHANGE UP (ref 7–23)
CALCIUM SERPL-MCNC: 8.7 MG/DL — SIGNIFICANT CHANGE UP (ref 8.4–10.5)
CHLORIDE SERPL-SCNC: 101 MMOL/L — SIGNIFICANT CHANGE UP (ref 98–107)
CO2 SERPL-SCNC: 25 MMOL/L — SIGNIFICANT CHANGE UP (ref 22–31)
CREAT SERPL-MCNC: 0.62 MG/DL — SIGNIFICANT CHANGE UP (ref 0.5–1.3)
GLUCOSE SERPL-MCNC: 87 MG/DL — SIGNIFICANT CHANGE UP (ref 70–99)
HCT VFR BLD CALC: 35.1 % — SIGNIFICANT CHANGE UP (ref 34.5–45)
HGB BLD-MCNC: 11.3 G/DL — LOW (ref 11.5–15.5)
INR BLD: 1.08 — SIGNIFICANT CHANGE UP (ref 0.88–1.17)
MAGNESIUM SERPL-MCNC: 2.1 MG/DL — SIGNIFICANT CHANGE UP (ref 1.6–2.6)
MCHC RBC-ENTMCNC: 24.2 PG — LOW (ref 27–34)
MCHC RBC-ENTMCNC: 32.2 % — SIGNIFICANT CHANGE UP (ref 32–36)
MCV RBC AUTO: 75.2 FL — LOW (ref 80–100)
NRBC # FLD: 0 K/UL — SIGNIFICANT CHANGE UP (ref 0–0)
PLATELET # BLD AUTO: 328 K/UL — SIGNIFICANT CHANGE UP (ref 150–400)
PMV BLD: 11.3 FL — SIGNIFICANT CHANGE UP (ref 7–13)
POTASSIUM SERPL-MCNC: 3.9 MMOL/L — SIGNIFICANT CHANGE UP (ref 3.5–5.3)
POTASSIUM SERPL-SCNC: 3.9 MMOL/L — SIGNIFICANT CHANGE UP (ref 3.5–5.3)
PROTHROM AB SERPL-ACNC: 12.3 SEC — SIGNIFICANT CHANGE UP (ref 9.8–13.1)
RBC # BLD: 4.67 M/UL — SIGNIFICANT CHANGE UP (ref 3.8–5.2)
RBC # FLD: 17.4 % — HIGH (ref 10.3–14.5)
SODIUM SERPL-SCNC: 138 MMOL/L — SIGNIFICANT CHANGE UP (ref 135–145)
WBC # BLD: 10.97 K/UL — HIGH (ref 3.8–10.5)
WBC # FLD AUTO: 10.97 K/UL — HIGH (ref 3.8–10.5)

## 2019-11-25 PROCEDURE — 33285 INSJ SUBQ CAR RHYTHM MNTR: CPT

## 2019-11-25 RX ORDER — METOPROLOL TARTRATE 50 MG
0.5 TABLET ORAL
Qty: 30 | Refills: 0
Start: 2019-11-25 | End: 2019-12-24

## 2019-11-25 RX ORDER — SODIUM CHLORIDE 0.65 %
1 AEROSOL, SPRAY (ML) NASAL
Qty: 1 | Refills: 0
Start: 2019-11-25 | End: 2019-11-26

## 2019-11-25 RX ADMIN — AMLODIPINE BESYLATE 5 MILLIGRAM(S): 2.5 TABLET ORAL at 06:02

## 2019-11-25 RX ADMIN — Medication 12.5 MILLIGRAM(S): at 06:01

## 2019-11-25 RX ADMIN — Medication 12.5 MILLIGRAM(S): at 18:44

## 2019-11-25 NOTE — PROGRESS NOTE ADULT - SUBJECTIVE AND OBJECTIVE BOX
Patient seen and examined at bedside.    Overnight Events:   No overnight events. Pt. consented for ILR all questions answered, risks, benefits, alterantives explained. Pt. agreeable for procedure.     REVIEW OF SYSTEMS:  Constitutional:     [ ] negative [ ] fevers [ ] chills [ ] weight loss [ ] weight gain  HEENT:                  [ ] negative [ ] dry eyes [ ] eye irritation [ ] postnasal drip [ ] nasal congestion  CV:                         [ ] negative  [ ] chest pain [ ] orthopnea [ ] palpitations [ ] murmur  Resp:                     [ ] negative [ ] cough [ ] shortness of breath [ ] dyspnea [ ] wheezing [ ] sputum [ ]hemoptysis  GI:                          [ ] negative [ ] nausea [ ] vomiting [ ] diarrhea [ ] constipation [ ] abd pain [ ] dysphagia   :                        [ ] negative [ ] dysuria [ ] nocturia [ ] hematuria [ ] increased urinary frequency  Musculoskeletal: [ ] negative [ ] back pain [ ] myalgias [ ] arthralgias [ ] fracture  Skin:                       [ ] negative [ ] rash [ ] itch  Neurological:        [ ] negative [ ] headache [ ] dizziness [ ] syncope [ ] weakness [ ] numbness  Psychiatric:           [ ] negative [ ] anxiety [ ] depression  Endocrine:            [ ] negative [ ] diabetes [ ] thyroid problem  Heme/Lymph:      [ ] negative [ ] anemia [ ] bleeding problem  Allergic/Immune: [ ] negative [ ] itchy eyes [ ] nasal discharge [ ] hives [ ] angioedema    [ ] All other systems negative  [ ] Unable to assess ROS due to    Current Meds:  amLODIPine   Tablet 5 milliGRAM(s) Oral daily  metoprolol tartrate 12.5 milliGRAM(s) Oral two times a day  sodium chloride 0.65% Nasal 1 Spray(s) Right Nostril two times a day PRN      PAST MEDICAL & SURGICAL HISTORY:  No pertinent past medical history  S/P cholecystectomy: &gt;10 years ago      Vitals:  T(F): 97.8 (11-25), Max: 98.8 (11-24)  HR: 67 (11-25) (67 - 68)  BP: 156/76 (11-25) (131/58 - 156/76)  RR: 18 (11-25)  SpO2: 100% (11-25)  I&O's Summary      Physical Exam:  Appearance: No acute distress; well appearing  Eyes: PERRL, EOMI, pink conjunctiva  HENT: Normal oral mucosa  Cardiovascular: RRR, S1, S2, no murmurs, rubs, or gallops; no edema; no JVD  Respiratory: Clear to auscultation bilaterally  Gastrointestinal: soft, non-tender, non-distended with normal bowel sounds  Musculoskeletal: No clubbing; no joint deformity   Neurologic: Non-focal  Lymphatic: No lymphadenopathy  Psychiatry: AAOx3, mood & affect appropriate  Skin: No rashes, ecchymoses, or cyanosis                          11.3   10.97 )-----------( 328      ( 25 Nov 2019 06:25 )             35.1     11-25    138  |  101  |  10  ----------------------------<  87  3.9   |  25  |  0.62    Ca    8.7      25 Nov 2019 06:25  Mg     2.1     11-25      PT/INR - ( 25 Nov 2019 06:25 )   PT: 12.3 SEC;   INR: 1.08          PTT - ( 25 Nov 2019 06:25 )  PTT:31.4 SEC              New ECG(s): Personally reviewed    Echo:  < from: Transthoracic Echocardiogram (11.23.19 @ 10:56) >  CONCLUSIONS:  1. Mildly dilated left atrium.  LA volume index = 37 cc/m2.  2. Endocardium not well visualized; grossly normal left  ventricular systolic function.  3. The right ventricle is not well visualized; grossly  normal right ventricular systolic function.  4. Estimated right ventricular systolic pressure equals 45  mm Hg, assuming right atrial pressure equals 10 mm Hg,  consistent with mild pulmonary hypertension.  *** No previous Echo exam.    < end of copied text >    Stress Testing:     Cath:    Imaging:    Interpretation of Telemetry:

## 2019-11-25 NOTE — PROGRESS NOTE ADULT - ASSESSMENT
82 YO F with recent epistaxis found to be febrile and leukocytosis. Monitored and given abx but low suspicion for infectious etiology. Planned for d/c today but while ambulating pt. became tachycardic.     Tele strip with Afib RVR and some aberrent conduction.   pt. asymptomatic at this time  continue 25mg PO metoprolol BID  TTE with dilated LA and mild pHTN otherwise wnl  Anticoagulation deferred until outpatient given significant epistaxis.   Awaiting TFT's.   Planned for ILR today.   Please call EP with any questions      Maykel Simons  Cardiology Fellow  [phone]  All Cardiology service information can be found 24/7 on [email or web address], password: [email or web address]

## 2019-11-25 NOTE — CHART NOTE - NSCHARTNOTEFT_GEN_A_CORE
Type of Procedure: Implantable Loop Recorder Implant  Licensed independent practitioner: Ciro Gutierrez MD  Assistant: none  Description of procedure: sterile conditions, antibiotic coverage, ILR implanted 4th ICS left parasternal area  Findings of procedure: Normal sensing  Estimated blood loss: < 10 cc  Specimen removed: none  Preoperative Dx: cryptogenic stroke  Postoperative Dx: cryptogenic stroke  Complications: none  Anesthesia type: local anesthesia    Ciro Gutierrez MD

## 2019-11-25 NOTE — CHART NOTE - NSCHARTNOTEFT_GEN_A_CORE
ELECTROPHYSIOLOGY      Patient s/p ILR implant. She is feeling well. Site is clean/dry and benign with no active bleeding or hematoma. Patient with family at bedside. Instruction provided regarding post op care. Patient can remove top dressing tomorrow. She may shower and wash site gently with soap and water. Reviewed instructions regarding [employer] Carelink transmitter and all questions were answered to patient and family's satisfaction. Patient will follow up in device clinic on December 20, 2019 at 12:30p. Written instructions were provided.

## 2019-12-20 ENCOUNTER — APPOINTMENT (OUTPATIENT)
Dept: ELECTROPHYSIOLOGY | Facility: CLINIC | Age: 84
End: 2019-12-20
Payer: MEDICAID

## 2019-12-20 DIAGNOSIS — Z87.898 PERSONAL HISTORY OF OTHER SPECIFIED CONDITIONS: ICD-10-CM

## 2019-12-20 DIAGNOSIS — R65.10 SYSTEMIC INFLAMMATORY RESPONSE SYNDROME (SIRS) OF NON-INFECTIOUS ORIGIN W/OUT ACUTE ORGAN DYSFUNCTION: ICD-10-CM

## 2019-12-20 DIAGNOSIS — I10 ESSENTIAL (PRIMARY) HYPERTENSION: ICD-10-CM

## 2019-12-20 PROCEDURE — 93285 PRGRMG DEV EVAL SCRMS IP: CPT

## 2020-01-22 ENCOUNTER — APPOINTMENT (OUTPATIENT)
Dept: ELECTROPHYSIOLOGY | Facility: CLINIC | Age: 85
End: 2020-01-22
Payer: SELF-PAY

## 2020-01-22 PROCEDURE — 93298 REM INTERROG DEV EVAL SCRMS: CPT

## 2020-01-22 PROCEDURE — G2066: CPT

## 2020-02-04 ENCOUNTER — APPOINTMENT (OUTPATIENT)
Dept: OTOLARYNGOLOGY | Facility: CLINIC | Age: 85
End: 2020-02-04

## 2020-02-24 ENCOUNTER — APPOINTMENT (OUTPATIENT)
Dept: ELECTROPHYSIOLOGY | Facility: CLINIC | Age: 85
End: 2020-02-24
Payer: SELF-PAY

## 2020-02-24 PROCEDURE — 93298 REM INTERROG DEV EVAL SCRMS: CPT

## 2020-02-24 PROCEDURE — G2066: CPT

## 2020-03-24 ENCOUNTER — APPOINTMENT (OUTPATIENT)
Dept: ELECTROPHYSIOLOGY | Facility: CLINIC | Age: 85
End: 2020-03-24
Payer: SELF-PAY

## 2020-03-24 PROCEDURE — 93298 REM INTERROG DEV EVAL SCRMS: CPT

## 2020-03-24 PROCEDURE — G2066: CPT

## 2020-04-24 ENCOUNTER — APPOINTMENT (OUTPATIENT)
Dept: ELECTROPHYSIOLOGY | Facility: CLINIC | Age: 85
End: 2020-04-24
Payer: SELF-PAY

## 2020-04-24 PROCEDURE — G2066: CPT

## 2020-04-24 PROCEDURE — 93298 REM INTERROG DEV EVAL SCRMS: CPT

## 2020-05-28 ENCOUNTER — APPOINTMENT (OUTPATIENT)
Dept: ELECTROPHYSIOLOGY | Facility: CLINIC | Age: 85
End: 2020-05-28
Payer: SELF-PAY

## 2020-05-28 PROCEDURE — 93298 REM INTERROG DEV EVAL SCRMS: CPT

## 2020-05-28 PROCEDURE — G2066: CPT

## 2020-06-22 ENCOUNTER — APPOINTMENT (OUTPATIENT)
Dept: ELECTROPHYSIOLOGY | Facility: CLINIC | Age: 85
End: 2020-06-22

## 2020-06-29 ENCOUNTER — APPOINTMENT (OUTPATIENT)
Dept: ELECTROPHYSIOLOGY | Facility: CLINIC | Age: 85
End: 2020-06-29

## 2020-11-11 NOTE — ED ADULT NURSE NOTE - PAIN: PRESENCE, MLM
Severe at 2.9  Replace parenterally  Likely 2/2 malnutrition/dehydration     complains of pain/discomfort

## 2021-05-18 ENCOUNTER — APPOINTMENT (OUTPATIENT)
Dept: ELECTROPHYSIOLOGY | Facility: CLINIC | Age: 86
End: 2021-05-18
Payer: MEDICAID

## 2021-05-18 DIAGNOSIS — I48.0 PAROXYSMAL ATRIAL FIBRILLATION: ICD-10-CM

## 2021-05-18 PROCEDURE — 93285 PRGRMG DEV EVAL SCRMS IP: CPT

## 2021-05-18 RX ORDER — METOPROLOL TARTRATE 25 MG/1
25 TABLET, FILM COATED ORAL TWICE DAILY
Refills: 0 | Status: ACTIVE | COMMUNITY

## 2021-05-18 RX ORDER — AMLODIPINE BESYLATE 5 MG/1
5 TABLET ORAL DAILY
Refills: 0 | Status: DISCONTINUED | COMMUNITY
End: 2021-05-18

## 2021-05-19 PROBLEM — I48.0 PAROXYSMAL ATRIAL FIBRILLATION: Status: ACTIVE | Noted: 2019-12-20

## 2021-07-19 ENCOUNTER — NON-APPOINTMENT (OUTPATIENT)
Age: 86
End: 2021-07-19

## 2021-07-19 ENCOUNTER — APPOINTMENT (OUTPATIENT)
Dept: ELECTROPHYSIOLOGY | Facility: CLINIC | Age: 86
End: 2021-07-19
Payer: MEDICAID

## 2021-07-19 PROCEDURE — 93298 REM INTERROG DEV EVAL SCRMS: CPT

## 2021-07-19 PROCEDURE — G2066: CPT | Mod: NC

## 2021-08-06 ENCOUNTER — NON-APPOINTMENT (OUTPATIENT)
Age: 86
End: 2021-08-06

## 2021-08-23 ENCOUNTER — APPOINTMENT (OUTPATIENT)
Dept: ELECTROPHYSIOLOGY | Facility: CLINIC | Age: 86
End: 2021-08-23
Payer: MEDICAID

## 2021-08-23 ENCOUNTER — NON-APPOINTMENT (OUTPATIENT)
Age: 86
End: 2021-08-23

## 2021-08-23 PROCEDURE — 93298 REM INTERROG DEV EVAL SCRMS: CPT

## 2021-08-23 PROCEDURE — G2066: CPT

## 2021-09-27 ENCOUNTER — APPOINTMENT (OUTPATIENT)
Dept: ELECTROPHYSIOLOGY | Facility: CLINIC | Age: 86
End: 2021-09-27
Payer: MEDICAID

## 2021-09-27 ENCOUNTER — NON-APPOINTMENT (OUTPATIENT)
Age: 86
End: 2021-09-27

## 2021-09-27 PROCEDURE — G2066: CPT

## 2021-09-27 PROCEDURE — 93298 REM INTERROG DEV EVAL SCRMS: CPT

## 2021-11-01 ENCOUNTER — APPOINTMENT (OUTPATIENT)
Dept: ELECTROPHYSIOLOGY | Facility: CLINIC | Age: 86
End: 2021-11-01
Payer: MEDICAID

## 2021-11-01 ENCOUNTER — NON-APPOINTMENT (OUTPATIENT)
Age: 86
End: 2021-11-01

## 2021-11-01 PROCEDURE — 93298 REM INTERROG DEV EVAL SCRMS: CPT

## 2021-11-01 PROCEDURE — G2066: CPT

## 2021-12-06 ENCOUNTER — NON-APPOINTMENT (OUTPATIENT)
Age: 86
End: 2021-12-06

## 2021-12-06 ENCOUNTER — APPOINTMENT (OUTPATIENT)
Dept: ELECTROPHYSIOLOGY | Facility: CLINIC | Age: 86
End: 2021-12-06
Payer: MEDICAID

## 2021-12-06 PROCEDURE — 93298 REM INTERROG DEV EVAL SCRMS: CPT

## 2021-12-06 PROCEDURE — G2066: CPT

## 2022-01-10 ENCOUNTER — NON-APPOINTMENT (OUTPATIENT)
Age: 87
End: 2022-01-10

## 2022-01-10 ENCOUNTER — APPOINTMENT (OUTPATIENT)
Dept: ELECTROPHYSIOLOGY | Facility: CLINIC | Age: 87
End: 2022-01-10
Payer: MEDICAID

## 2022-01-10 PROCEDURE — 93298 REM INTERROG DEV EVAL SCRMS: CPT

## 2022-01-10 PROCEDURE — G2066: CPT

## 2022-02-14 ENCOUNTER — NON-APPOINTMENT (OUTPATIENT)
Age: 87
End: 2022-02-14

## 2022-02-14 ENCOUNTER — APPOINTMENT (OUTPATIENT)
Dept: ELECTROPHYSIOLOGY | Facility: CLINIC | Age: 87
End: 2022-02-14
Payer: MEDICAID

## 2022-02-14 PROCEDURE — G2066: CPT

## 2022-02-14 PROCEDURE — 93298 REM INTERROG DEV EVAL SCRMS: CPT

## 2022-03-21 ENCOUNTER — APPOINTMENT (OUTPATIENT)
Dept: ELECTROPHYSIOLOGY | Facility: CLINIC | Age: 87
End: 2022-03-21

## 2022-03-21 ENCOUNTER — NON-APPOINTMENT (OUTPATIENT)
Age: 87
End: 2022-03-21

## 2022-04-25 ENCOUNTER — APPOINTMENT (OUTPATIENT)
Dept: ELECTROPHYSIOLOGY | Facility: CLINIC | Age: 87
End: 2022-04-25

## 2022-05-20 ENCOUNTER — APPOINTMENT (OUTPATIENT)
Dept: ELECTROPHYSIOLOGY | Facility: CLINIC | Age: 87
End: 2022-05-20

## 2023-10-04 NOTE — H&P ADULT - NSICDXFAMILYHX_GEN_ALL_CORE_FT
Nadiya Lance (:  1986) is a 31 y.o. female,Established patient, here for evaluation of the following chief complaint(s): Other (Stopped depression meds over past month//Wants to do a trial period of not being on meds) and Anxiety (Discuss meds to help)  She still feels mild med w/d, but is getting better daily       ASSESSMENT/PLAN:  1. Anxiety- stable off meds, will fu if needed  2. Recurrent major depressive disorder, in full remission ([address])  Assessment & Plan:   is off meds now and trying to stay off, fu PRN  PHQ-2=2      Return if symptoms worsen or fail to improve. Subjective   SUBJECTIVE/OBJECTIVE:  Anxiety  Presents for follow-up visit. Symptoms include nervous/anxious behavior. Symptoms occur most days. The severity of symptoms is mild. Review of Systems   Psychiatric/Behavioral:  The patient is nervous/anxious. Objective   Physical Exam  Vitals and nursing note reviewed. Constitutional:       General: She is not in acute distress. Appearance: Normal appearance. She is obese. HENT:      Head: Normocephalic and atraumatic. Nose: Nose normal.      Mouth/Throat:      Pharynx: Oropharynx is clear. Eyes:      Extraocular Movements: Extraocular movements intact. Conjunctiva/sclera: Conjunctivae normal.   Cardiovascular:      Rate and Rhythm: Normal rate and regular rhythm. Pulses: Normal pulses. Heart sounds: Normal heart sounds. Pulmonary:      Effort: Pulmonary effort is normal.      Breath sounds: Normal breath sounds. Musculoskeletal:         General: No swelling or tenderness. Normal range of motion. Cervical back: Normal range of motion and neck supple. Skin:     General: Skin is warm and dry. Neurological:      General: No focal deficit present. Mental Status: She is alert. Mental status is at baseline.    Psychiatric:         Mood and Affect: Mood normal.         Behavior: Behavior normal.              An electronic FAMILY HISTORY:  FH: type 2 diabetes mellitus, Son
